# Patient Record
Sex: FEMALE | Race: WHITE | NOT HISPANIC OR LATINO | Employment: FULL TIME | URBAN - METROPOLITAN AREA
[De-identification: names, ages, dates, MRNs, and addresses within clinical notes are randomized per-mention and may not be internally consistent; named-entity substitution may affect disease eponyms.]

---

## 2017-02-20 DIAGNOSIS — J11.1 INFLUENZA: Primary | ICD-10-CM

## 2017-02-20 RX ORDER — OSELTAMIVIR PHOSPHATE 75 MG/1
75 CAPSULE ORAL 2 TIMES DAILY
Qty: 10 CAPSULE | Refills: 0 | Status: SHIPPED | OUTPATIENT
Start: 2017-02-20 | End: 2017-02-21

## 2017-02-21 ENCOUNTER — OFFICE VISIT (OUTPATIENT)
Dept: FAMILY MEDICINE CLINIC | Facility: CLINIC | Age: 47
End: 2017-02-21

## 2017-02-21 VITALS
HEART RATE: 86 BPM | OXYGEN SATURATION: 97 % | HEIGHT: 64 IN | BODY MASS INDEX: 40.97 KG/M2 | SYSTOLIC BLOOD PRESSURE: 134 MMHG | WEIGHT: 240 LBS | TEMPERATURE: 97.6 F | DIASTOLIC BLOOD PRESSURE: 88 MMHG

## 2017-02-21 DIAGNOSIS — B34.9 VIRAL SYNDROME: Primary | ICD-10-CM

## 2017-02-21 PROCEDURE — 99213 OFFICE O/P EST LOW 20 MIN: CPT | Performed by: NURSE PRACTITIONER

## 2017-02-21 RX ORDER — OSELTAMIVIR PHOSPHATE 75 MG/1
CAPSULE ORAL
COMMUNITY
Start: 2017-02-20 | End: 2017-02-21

## 2017-02-21 NOTE — PROGRESS NOTES
Subjective   Sharee Cheek is a 46 y.o. female.     History of Present Illness Daughter diagnosed with Flu A 4 days ago. 2 days ago she had sudden onset of headache, myalgia and cough. She does not want a flu test. She is worried that now she has OM due to left otalgia. She is also complaining of dry cough. No sob or wheezing. Treated herself with Tylenol.  She is a nurse at [employer]. She did have a flu vacc this year.    The following portions of the patient's history were reviewed and updated as appropriate: allergies, current medications, past family history, past medical history, past social history, past surgical history and problem list.    Review of Systems   Constitutional: Positive for fever. Negative for fatigue and unexpected weight change.   HENT: Positive for ear discharge. Negative for congestion, hearing loss, nosebleeds, rhinorrhea, sore throat, trouble swallowing and voice change.    Eyes: Negative for pain, discharge, redness and visual disturbance.   Respiratory: Positive for cough. Negative for chest tightness, shortness of breath and wheezing.    Cardiovascular: Negative for chest pain, palpitations and leg swelling.   Gastrointestinal: Negative for abdominal distention, abdominal pain, anal bleeding, blood in stool, constipation, diarrhea, nausea and vomiting.   Endocrine: Negative for cold intolerance, heat intolerance, polydipsia, polyphagia and polyuria.   Genitourinary: Negative for dysuria, flank pain, frequency and hematuria.   Musculoskeletal: Positive for myalgias. Negative for arthralgias, gait problem and joint swelling.   Skin: Negative for color change and rash.   Neurological: Negative for dizziness, tremors, seizures, syncope, speech difficulty, weakness, numbness and headaches.   Hematological: Negative.    Psychiatric/Behavioral: Negative.        Objective   Physical Exam   Constitutional: She is oriented to person, place, and time. She appears well-developed and  well-nourished. No distress.   HENT:   Head: Normocephalic and atraumatic.   Right Ear: Tympanic membrane and external ear normal.   Left Ear: Tympanic membrane and external ear normal.   Nose: Nose normal.   Mouth/Throat: Oropharynx is clear and moist. No oropharyngeal exudate.   Eyes: Conjunctivae are normal. Pupils are equal, round, and reactive to light. Right eye exhibits no discharge. Left eye exhibits no discharge. No scleral icterus.   Neck: Neck supple. No tracheal deviation present. No thyromegaly present.   Cardiovascular: Normal rate, regular rhythm and normal heart sounds.  Exam reveals no gallop and no friction rub.    No murmur heard.  Pulmonary/Chest: Effort normal and breath sounds normal. No respiratory distress. She has no wheezes.   Abdominal: Soft. Bowel sounds are normal. She exhibits no distension and no mass. There is no tenderness.   Musculoskeletal: She exhibits no edema or deformity.   Lymphadenopathy:     She has no cervical adenopathy.   Neurological: She is alert and oriented to person, place, and time. Coordination normal.   Skin: Skin is warm and dry. No rash noted. No erythema.   Psychiatric: She has a normal mood and affect. Her speech is normal and behavior is normal. Judgment and thought content normal.   Nursing note and vitals reviewed.      Assessment/Plan   Sharee was seen today for flu symptoms.    Diagnoses and all orders for this visit:    Viral syndrome    I offered to do a flu test or prescribe Tamiflu but she declined at this time. She will treat her symptoms at home. Increase fluids and rest. Follow up symptoms persist or worsen.

## 2017-04-09 DIAGNOSIS — I10 ESSENTIAL HYPERTENSION: ICD-10-CM

## 2017-04-10 RX ORDER — LOSARTAN POTASSIUM 50 MG/1
TABLET ORAL
Qty: 30 TABLET | Refills: 4 | Status: SHIPPED | OUTPATIENT
Start: 2017-04-10 | End: 2017-08-16 | Stop reason: SDUPTHER

## 2017-08-16 DIAGNOSIS — I10 ESSENTIAL HYPERTENSION: ICD-10-CM

## 2017-08-16 DIAGNOSIS — E03.9 HYPOTHYROIDISM, UNSPECIFIED TYPE: Primary | ICD-10-CM

## 2017-08-16 RX ORDER — LOSARTAN POTASSIUM 50 MG/1
50 TABLET ORAL DAILY
Qty: 30 TABLET | Refills: 4 | Status: SHIPPED | OUTPATIENT
Start: 2017-08-16 | End: 2017-12-27 | Stop reason: SDUPTHER

## 2017-08-16 RX ORDER — LEVOTHYROXINE SODIUM 0.07 MG/1
75 TABLET ORAL DAILY
Qty: 30 TABLET | Refills: 4 | Status: SHIPPED | OUTPATIENT
Start: 2017-08-16 | End: 2017-12-27 | Stop reason: SDUPTHER

## 2017-08-16 RX ORDER — VENLAFAXINE HYDROCHLORIDE 37.5 MG/1
37.5 CAPSULE, EXTENDED RELEASE ORAL DAILY
Qty: 30 CAPSULE | Refills: 4 | Status: SHIPPED | OUTPATIENT
Start: 2017-08-16 | End: 2017-12-27

## 2017-12-27 ENCOUNTER — OFFICE VISIT (OUTPATIENT)
Dept: FAMILY MEDICINE CLINIC | Facility: CLINIC | Age: 47
End: 2017-12-27

## 2017-12-27 VITALS
WEIGHT: 232 LBS | OXYGEN SATURATION: 99 % | HEART RATE: 74 BPM | BODY MASS INDEX: 39.61 KG/M2 | DIASTOLIC BLOOD PRESSURE: 82 MMHG | TEMPERATURE: 97.9 F | SYSTOLIC BLOOD PRESSURE: 152 MMHG | HEIGHT: 64 IN

## 2017-12-27 DIAGNOSIS — E04.9 GOITER: ICD-10-CM

## 2017-12-27 DIAGNOSIS — I10 ESSENTIAL HYPERTENSION: ICD-10-CM

## 2017-12-27 DIAGNOSIS — E03.9 ACQUIRED HYPOTHYROIDISM: Primary | ICD-10-CM

## 2017-12-27 DIAGNOSIS — E03.9 HYPOTHYROIDISM, UNSPECIFIED TYPE: ICD-10-CM

## 2017-12-27 LAB
ALBUMIN SERPL-MCNC: 4.1 G/DL (ref 3.2–4.8)
ALBUMIN/GLOB SERPL: 1.4 G/DL (ref 1.5–2.5)
ALP SERPL-CCNC: 91 U/L (ref 25–100)
ALT SERPL W P-5'-P-CCNC: 17 U/L (ref 7–40)
ANION GAP SERPL CALCULATED.3IONS-SCNC: 3 MMOL/L (ref 3–11)
ARTICHOKE IGE QN: 144 MG/DL (ref 0–130)
AST SERPL-CCNC: 20 U/L (ref 0–33)
BASOPHILS # BLD AUTO: 0.05 10*3/MM3 (ref 0–0.2)
BASOPHILS NFR BLD AUTO: 0.6 % (ref 0–1)
BILIRUB SERPL-MCNC: 0.3 MG/DL (ref 0.3–1.2)
BUN BLD-MCNC: 9 MG/DL (ref 9–23)
BUN/CREAT SERPL: 15 (ref 7–25)
CALCIUM SPEC-SCNC: 9.1 MG/DL (ref 8.7–10.4)
CHLORIDE SERPL-SCNC: 104 MMOL/L (ref 99–109)
CHOLEST SERPL-MCNC: 220 MG/DL (ref 0–200)
CO2 SERPL-SCNC: 32 MMOL/L (ref 20–31)
CREAT BLD-MCNC: 0.6 MG/DL (ref 0.6–1.3)
DEPRECATED RDW RBC AUTO: 42.6 FL (ref 37–54)
EOSINOPHIL # BLD AUTO: 0.23 10*3/MM3 (ref 0–0.3)
EOSINOPHIL NFR BLD AUTO: 2.8 % (ref 0–3)
ERYTHROCYTE [DISTWIDTH] IN BLOOD BY AUTOMATED COUNT: 13.9 % (ref 11.3–14.5)
EXPIRATION DATE: NORMAL
GFR SERPL CREATININE-BSD FRML MDRD: 108 ML/MIN/1.73
GLOBULIN UR ELPH-MCNC: 2.9 GM/DL
GLUCOSE BLD-MCNC: 99 MG/DL (ref 70–100)
HBA1C MFR BLD: 5.7 %
HCT VFR BLD AUTO: 39.6 % (ref 34.5–44)
HDLC SERPL-MCNC: 66 MG/DL (ref 40–60)
HGB BLD-MCNC: 12.7 G/DL (ref 11.5–15.5)
IMM GRANULOCYTES # BLD: 0.02 10*3/MM3 (ref 0–0.03)
IMM GRANULOCYTES NFR BLD: 0.2 % (ref 0–0.6)
LYMPHOCYTES # BLD AUTO: 2.17 10*3/MM3 (ref 0.6–4.8)
LYMPHOCYTES NFR BLD AUTO: 26.2 % (ref 24–44)
Lab: NORMAL
MCH RBC QN AUTO: 27 PG (ref 27–31)
MCHC RBC AUTO-ENTMCNC: 32.1 G/DL (ref 32–36)
MCV RBC AUTO: 84.3 FL (ref 80–99)
MONOCYTES # BLD AUTO: 0.49 10*3/MM3 (ref 0–1)
MONOCYTES NFR BLD AUTO: 5.9 % (ref 0–12)
NEUTROPHILS # BLD AUTO: 5.33 10*3/MM3 (ref 1.5–8.3)
NEUTROPHILS NFR BLD AUTO: 64.3 % (ref 41–71)
PLATELET # BLD AUTO: 313 10*3/MM3 (ref 150–450)
PMV BLD AUTO: 9.5 FL (ref 6–12)
POTASSIUM BLD-SCNC: 4.3 MMOL/L (ref 3.5–5.5)
PROT SERPL-MCNC: 7 G/DL (ref 5.7–8.2)
RBC # BLD AUTO: 4.7 10*6/MM3 (ref 3.89–5.14)
SODIUM BLD-SCNC: 139 MMOL/L (ref 132–146)
T4 FREE SERPL-MCNC: 1.26 NG/DL (ref 0.89–1.76)
TRIGL SERPL-MCNC: 126 MG/DL (ref 0–150)
TSH SERPL DL<=0.05 MIU/L-ACNC: 3.25 MIU/ML (ref 0.35–5.35)
WBC NRBC COR # BLD: 8.29 10*3/MM3 (ref 3.5–10.8)

## 2017-12-27 PROCEDURE — 36415 COLL VENOUS BLD VENIPUNCTURE: CPT | Performed by: FAMILY MEDICINE

## 2017-12-27 PROCEDURE — 83036 HEMOGLOBIN GLYCOSYLATED A1C: CPT | Performed by: FAMILY MEDICINE

## 2017-12-27 PROCEDURE — 85025 COMPLETE CBC W/AUTO DIFF WBC: CPT | Performed by: FAMILY MEDICINE

## 2017-12-27 PROCEDURE — 84443 ASSAY THYROID STIM HORMONE: CPT | Performed by: FAMILY MEDICINE

## 2017-12-27 PROCEDURE — 99214 OFFICE O/P EST MOD 30 MIN: CPT | Performed by: FAMILY MEDICINE

## 2017-12-27 PROCEDURE — 80061 LIPID PANEL: CPT | Performed by: FAMILY MEDICINE

## 2017-12-27 PROCEDURE — 84439 ASSAY OF FREE THYROXINE: CPT | Performed by: FAMILY MEDICINE

## 2017-12-27 PROCEDURE — 80053 COMPREHEN METABOLIC PANEL: CPT | Performed by: FAMILY MEDICINE

## 2017-12-27 RX ORDER — LOSARTAN POTASSIUM 100 MG/1
100 TABLET ORAL DAILY
Qty: 30 TABLET | Refills: 11 | Status: SHIPPED | OUTPATIENT
Start: 2017-12-27 | End: 2018-12-30 | Stop reason: SDUPTHER

## 2017-12-27 RX ORDER — LEVOTHYROXINE SODIUM 0.07 MG/1
75 TABLET ORAL DAILY
Qty: 30 TABLET | Refills: 5 | Status: SHIPPED | OUTPATIENT
Start: 2017-12-27 | End: 2018-02-26 | Stop reason: SDUPTHER

## 2017-12-27 NOTE — PROGRESS NOTES
"Subjective   Sharee Cheek is a 47 y.o. female.     History of Present Illness   The patient is here for a follow up on Hypertension and Hypothyroidism.    She states she is doing well.  Denies any chest pain or shortness of breath.    BP today is 152/82. 150/85 on recheck.  Taking Losartan 50 mg daily.    Taking Levothyroxine 75 mcg daily for Hypothyroidism.    States she is no longer taking the Venlafaxine.   She is trying to do other things to control her anxiety.  She states she has extra work load at her job and is trying to doing natural measures to control the stress and anxiety.      The following portions of the patient's history were reviewed and updated as appropriate: allergies, current medications, past social history and problem list.    Review of Systems   Respiratory: Negative for shortness of breath.    Cardiovascular: Negative for chest pain.   Psychiatric/Behavioral: The patient is nervous/anxious (intermittent).        Objective   /82  Pulse 74  Temp 97.9 °F (36.6 °C)  Ht 162.6 cm (64.02\")  Wt 105 kg (232 lb)  SpO2 99%  BMI 39.8 kg/m2  Physical Exam   Constitutional: She appears well-developed and well-nourished.   Cardiovascular: Normal rate and regular rhythm.    Pulmonary/Chest: Effort normal and breath sounds normal.   Nursing note and vitals reviewed.      Assessment/Plan   Problem List Items Addressed This Visit        Cardiovascular and Mediastinum    Hypertension       Endocrine    Hypothyroidism - Primary      Other Visit Diagnoses     Goiter                  Drink plenty fluids.  Work on diet and weight loss.    Continue medications as doing.    Check a A1c,CBC,CMP,Lipids,Free T 4, and TSH. Report results by letter.    Refer to endocrinology.    Follow up in 6 months.          Scribed for Dr Suhas Fraser by Darline Lua [facility].          I, Suhas Fraser MD, personally performed the services described in this documentation, as scribed by Darline Lua in my presence, and " is both accurate and complete.

## 2018-02-26 DIAGNOSIS — E03.9 HYPOTHYROIDISM, UNSPECIFIED TYPE: ICD-10-CM

## 2018-02-27 RX ORDER — LEVOTHYROXINE SODIUM 0.07 MG/1
TABLET ORAL
Qty: 30 TABLET | Refills: 4 | Status: SHIPPED | OUTPATIENT
Start: 2018-02-27 | End: 2018-05-31 | Stop reason: ALTCHOICE

## 2018-05-15 ENCOUNTER — OFFICE VISIT (OUTPATIENT)
Dept: ENDOCRINOLOGY | Facility: CLINIC | Age: 48
End: 2018-05-15

## 2018-05-15 VITALS
HEART RATE: 64 BPM | SYSTOLIC BLOOD PRESSURE: 130 MMHG | WEIGHT: 236.2 LBS | DIASTOLIC BLOOD PRESSURE: 74 MMHG | HEIGHT: 64 IN | OXYGEN SATURATION: 98 % | BODY MASS INDEX: 40.33 KG/M2

## 2018-05-15 DIAGNOSIS — E04.1 SOLITARY THYROID NODULE: Primary | ICD-10-CM

## 2018-05-15 DIAGNOSIS — E03.9 ACQUIRED HYPOTHYROIDISM: ICD-10-CM

## 2018-05-15 DIAGNOSIS — E06.3 HASHIMOTO'S THYROIDITIS: ICD-10-CM

## 2018-05-15 LAB
T4 FREE SERPL-MCNC: 1.36 NG/DL (ref 0.89–1.76)
TSH SERPL DL<=0.05 MIU/L-ACNC: 2.64 MIU/ML (ref 0.35–5.35)

## 2018-05-15 PROCEDURE — 84439 ASSAY OF FREE THYROXINE: CPT | Performed by: INTERNAL MEDICINE

## 2018-05-15 PROCEDURE — 99244 OFF/OP CNSLTJ NEW/EST MOD 40: CPT | Performed by: INTERNAL MEDICINE

## 2018-05-15 PROCEDURE — 84443 ASSAY THYROID STIM HORMONE: CPT | Performed by: INTERNAL MEDICINE

## 2018-05-15 PROCEDURE — 76536 US EXAM OF HEAD AND NECK: CPT | Performed by: INTERNAL MEDICINE

## 2018-05-15 NOTE — PROGRESS NOTES
Thyroid Problem (Consult for Suhas Fraser, ultrasound today. C/o throat constantly feeling tender to the touch)    Subjective   Sharee Cheek is a 48 y.o. female. she is being seen for consultation today at the request of  Suhas Fraser MD for evaluation of   Hypothyroidism dx several years ago.  Levothyroxine 75 mcg. Last TSH was 3.5. Patient has been on the same dose for several years,     Thyroid nodule noted on us in 2014 - right 1.1 cm nodule. Patient reported sx of tenderness in the lower portion of the thyroid and swallowing difficulties.         History of Present Illness  The following portions of the patient's history were reviewed and updated as appropriate: She  has a past medical history of History of conjunctivitis; History of influenza; and History of sinusitis.  She  has a past surgical history that includes Laparoscopic cholecystectomy; diagnostic laparoscopy exploratory laparotomy; hysteroscopy endometrial ablation; and Tonsillectomy and adenoidectomy.  Her family history includes COPD in her other.  She  reports that she has never smoked. She has never used smokeless tobacco. She reports that she does not drink alcohol or use drugs.  She is allergic to keflex [cephalexin] and penicillins..    Medications:    Current Outpatient Prescriptions:   •  levothyroxine (SYNTHROID, LEVOTHROID) 75 MCG tablet, TAKE ONE TABLET BY MOUTH DAILY, Disp: 30 tablet, Rfl: 4  •  losartan (COZAAR) 100 MG tablet, Take 1 tablet by mouth Daily., Disp: 30 tablet, Rfl: 11    The following portions of the patient's history were reviewed and updated as appropriate: allergies, current medications, past family history, past medical history, past social history, past surgical history and problem list.    Review of Systems   HENT: Positive for hearing loss and trouble swallowing.    Endocrine: Positive for cold intolerance.   Genitourinary: Positive for menstrual problem.   Psychiatric/Behavioral: Positive for sleep  "disturbance. The patient is nervous/anxious.    All other systems reviewed and are negative.      Objective   Blood pressure 130/74, pulse 64, height 162.6 cm (64.02\"), weight 107 kg (236 lb 3.2 oz), SpO2 98 %.   Physical Exam   Constitutional: She is oriented to person, place, and time. She appears well-developed and well-nourished.   Obese,  Facial hair on the chin and lip noted.    HENT:   Head: Normocephalic and atraumatic.   Eyes: Conjunctivae are normal.   Neck: Normal range of motion. Thyromegaly: right 1 cm nodule, tender on palpation    Cardiovascular: Normal rate, regular rhythm, normal heart sounds and intact distal pulses.    Pulmonary/Chest: Effort normal and breath sounds normal.   Musculoskeletal: She exhibits no edema.   Lymphadenopathy:     She has no cervical adenopathy.   Neurological: She is alert and oriented to person, place, and time. She has normal reflexes.   Skin: No rash noted.   Psychiatric: She has a normal mood and affect. Thought content normal.   Nursing note and vitals reviewed.        Results for orders placed or performed in visit on 12/27/17   Comprehensive Metabolic Panel   Result Value Ref Range    Glucose 99 70 - 100 mg/dL    BUN 9 9 - 23 mg/dL    Creatinine 0.60 0.60 - 1.30 mg/dL    Sodium 139 132 - 146 mmol/L    Potassium 4.3 3.5 - 5.5 mmol/L    Chloride 104 99 - 109 mmol/L    CO2 32.0 (H) 20.0 - 31.0 mmol/L    Calcium 9.1 8.7 - 10.4 mg/dL    Total Protein 7.0 5.7 - 8.2 g/dL    Albumin 4.10 3.20 - 4.80 g/dL    ALT (SGPT) 17 7 - 40 U/L    AST (SGOT) 20 0 - 33 U/L    Alkaline Phosphatase 91 25 - 100 U/L    Total Bilirubin 0.3 0.3 - 1.2 mg/dL    eGFR Non African Amer 108 >60 mL/min/1.73    Globulin 2.9 gm/dL    A/G Ratio 1.4 (L) 1.5 - 2.5 g/dL    BUN/Creatinine Ratio 15.0 7.0 - 25.0    Anion Gap 3.0 3.0 - 11.0 mmol/L   Lipid Panel   Result Value Ref Range    Total Cholesterol 220 (H) 0 - 200 mg/dL    Triglycerides 126 0 - 150 mg/dL    HDL Cholesterol 66 (H) 40 - 60 mg/dL    LDL " Cholesterol  144 (H) 0 - 130 mg/dL   T4, Free   Result Value Ref Range    Free T4 1.26 0.89 - 1.76 ng/dL   TSH   Result Value Ref Range    TSH 3.250 0.350 - 5.350 mIU/mL   CBC Auto Differential   Result Value Ref Range    WBC 8.29 3.50 - 10.80 10*3/mm3    RBC 4.70 3.89 - 5.14 10*6/mm3    Hemoglobin 12.7 11.5 - 15.5 g/dL    Hematocrit 39.6 34.5 - 44.0 %    MCV 84.3 80.0 - 99.0 fL    MCH 27.0 27.0 - 31.0 pg    MCHC 32.1 32.0 - 36.0 g/dL    RDW 13.9 11.3 - 14.5 %    RDW-SD 42.6 37.0 - 54.0 fl    MPV 9.5 6.0 - 12.0 fL    Platelets 313 150 - 450 10*3/mm3    Neutrophil % 64.3 41.0 - 71.0 %    Lymphocyte % 26.2 24.0 - 44.0 %    Monocyte % 5.9 0.0 - 12.0 %    Eosinophil % 2.8 0.0 - 3.0 %    Basophil % 0.6 0.0 - 1.0 %    Immature Grans % 0.2 0.0 - 0.6 %    Neutrophils, Absolute 5.33 1.50 - 8.30 10*3/mm3    Lymphocytes, Absolute 2.17 0.60 - 4.80 10*3/mm3    Monocytes, Absolute 0.49 0.00 - 1.00 10*3/mm3    Eosinophils, Absolute 0.23 0.00 - 0.30 10*3/mm3    Basophils, Absolute 0.05 0.00 - 0.20 10*3/mm3    Immature Grans, Absolute 0.02 0.00 - 0.03 10*3/mm3   POC Glycosylated Hemoglobin (Hb A1C)   Result Value Ref Range    Hemoglobin A1C 5.7 %    Lot Number [id number]     Expiration Date 08/31/2019              Thyroid ultrasound 05/15/18              Real time high resolution imaging of the thyroid gland was performed in transverse and longitudinal planes.      The right lobe measured 5.13 cm L x 1.73 cm AP x 2.4 cm in TV dimension.    The isthmus measured 0.39 cm in thickness.    The left thyroid lobe measured 3.96 cm L x 0.98 cm AP x 1.15 cm in TV dimension.    Thyroid gland is heterogeneous and contains single nodules.    Nodule 1   is located in the right mid lobe and measures 1.42 x 0.72 x 1.14 cm (L X AP X TV).  This nodule is solid, homogeneous, hyperechoic with well-defined margins, and Grade II vascularity on Color Flow Doppler.  It has no artifacts      No pathologic lymph nodes were seen.      Assessment: Right  dominant nodule with benign u/s characteristics - no indication for FNA.  Repeat ultrasound in 12-18 months.       Assessment/Plan:    Problem List Items Addressed This Visit        Endocrine    Hashimoto's thyroiditis    Hypothyroidism    Relevant Orders    TSH    T4, Free      Other Visit Diagnoses     Solitary thyroid nodule    -  Primary    Relevant Orders    US Thyroid (Completed)      Right thyroid nodule is benign - no indications for FNA.   Patient has tendernes sin the thyroid gland, last TSh 3.5 - consider  Increasing levothyroxine dose to 88 mcg to evaluate sx improvement.   Synthroid samples provided to see if she is feeling better.   -labs today.   Images of current u/s reviewed and compared to 2014 exam - appearance of the nodule hasn't changed.     F/u in 4 months

## 2018-05-16 ENCOUNTER — TELEPHONE (OUTPATIENT)
Dept: ENDOCRINOLOGY | Facility: CLINIC | Age: 48
End: 2018-05-16

## 2018-05-16 NOTE — TELEPHONE ENCOUNTER
----- Message from Marcia COLE MD sent at 5/15/2018 10:55 PM EDT -----  Thyroid function is normal and improved from previous results,  there is no indication to increase Synthroid dose.

## 2018-05-31 ENCOUNTER — TELEPHONE (OUTPATIENT)
Dept: ENDOCRINOLOGY | Facility: CLINIC | Age: 48
End: 2018-05-31

## 2018-05-31 RX ORDER — LEVOTHYROXINE SODIUM 75 MCG
75 TABLET ORAL DAILY
Qty: 30 TABLET | Refills: 5 | Status: SHIPPED | OUTPATIENT
Start: 2018-05-31 | End: 2019-01-21 | Stop reason: SDUPTHER

## 2018-05-31 NOTE — TELEPHONE ENCOUNTER
Rx has been sent to pharmacy for Synthroid and per  I informed pt that it was completely fine for her to have laser hair removal on her neck

## 2018-05-31 NOTE — TELEPHONE ENCOUNTER
----- Message from Gill Womack sent at 5/30/2018  3:34 PM EDT -----  CALL FROM PT STATING THE TRIAL MEDS FOR THE BRAND NAME SYNTHROID IMPROVED SYMPTOMS OVER THE GENERIC SO SHE NEEDS A RX FOR THE BRAND NAME SENT OVER TO PHARMACY, [facility]. CALL BACK NUMBER FOR PT - [phone]  PT ALSO WANTS TO KNOW WITH HER HASHIMOTOS IS LASER HAIR REMOVAL ON THE NECK OK.

## 2018-06-09 ENCOUNTER — OFFICE VISIT (OUTPATIENT)
Dept: FAMILY MEDICINE CLINIC | Facility: CLINIC | Age: 48
End: 2018-06-09

## 2018-06-09 VITALS
HEART RATE: 88 BPM | WEIGHT: 236 LBS | DIASTOLIC BLOOD PRESSURE: 88 MMHG | RESPIRATION RATE: 16 BRPM | HEIGHT: 64 IN | SYSTOLIC BLOOD PRESSURE: 130 MMHG | OXYGEN SATURATION: 96 % | BODY MASS INDEX: 40.29 KG/M2 | TEMPERATURE: 97.2 F

## 2018-06-09 DIAGNOSIS — S70.362A TICK BITE OF LEFT THIGH, INITIAL ENCOUNTER: Primary | ICD-10-CM
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
## 2018-06-09 PROCEDURE — 36415 COLL VENOUS BLD VENIPUNCTURE: CPT | Performed by: FAMILY MEDICINE

## 2018-06-09 PROCEDURE — 86618 LYME DISEASE ANTIBODY: CPT | Performed by: FAMILY MEDICINE

## 2018-06-09 PROCEDURE — 99213 OFFICE O/P EST LOW 20 MIN: CPT | Performed by: FAMILY MEDICINE

## 2018-06-09 NOTE — PROGRESS NOTES
"Subjective   Sharee Cheek is a 48 y.o. female.     Nadia presents today with a skin lesion that has developed on the left medial thigh.  She first noticed this over with ago at which time she removed and intact dear tick.  It was attached.  On careful inspection she believes that the tick remained intact.  She brings in a specimen today and examination under magnification confirms that fact.  [place] but is unsure when the tick bite occurred.  She says since removing the tick there is been a persistent to 2 mm defect.  Over the last 2 or 3 days or is been some drainage.  The drainage is not purulent and there is been no scab.  Since his been no development of surrounding a rash with no erythema.  She denies any fever chills nausea or diarrhea.         The following portions of the patient's history were reviewed and updated as appropriate: allergies, current medications, past social history and problem list.    Review of Systems   Constitutional: Negative for chills, diaphoresis, fatigue and fever.   HENT: Negative for congestion.    Eyes: Negative for photophobia and redness.   Respiratory: Negative for cough and shortness of breath.    Cardiovascular: Negative for chest pain and leg swelling.   Gastrointestinal: Negative for nausea and vomiting.   Genitourinary: Negative for dysuria.   Musculoskeletal: Negative for arthralgias and myalgias.   Skin: Positive for wound.   Neurological: Negative for numbness and headaches.   Hematological: Negative for adenopathy. Does not bruise/bleed easily.       Objective   /88   Pulse 88   Temp 97.2 °F (36.2 °C)   Resp 16   Ht 162.6 cm (64.02\")   Wt 107 kg (236 lb)   SpO2 96%   BMI 40.48 kg/m²   Physical Exam   Constitutional: She appears well-developed and well-nourished.   Cardiovascular: Normal rate and regular rhythm.    Pulmonary/Chest: Effort normal and breath sounds normal.   Abdominal: Soft. Bowel " sounds are normal. There is no tenderness.   Skin:   examination of the medial aspect of the left thigh reveals a 2 mm superficial defect in the skin.  There is no erythema.  I do not detect any exudate as well.   Nursing note and vitals reviewed.      Assessment/Plan   Problem List Items Addressed This Visit     None      Visit Diagnoses     Tick bite of left thigh, initial encounter    -  Primary    Relevant Orders    Lyme, Total Antibody Test / Reflex       The patient definitely is suffered a bite to the left thigh as a result of a deer tick.  There is no rash and there are no systemic symptoms.  Nevertheless exposure to Lyme disease is to be considered.  We will check old G4 Lyme disease and the patient will check on my chart next week to see about the results.  We will also notify her of the results.  Should she develop a rash or should the Lyme antibody be positive then we will treat her with doxycycline.  Otherwise there should be no need.  The tick was examined today and is definitely a deer tick in the adult phase.

## 2018-06-11 LAB — B BURGDOR IGG+IGM SER-ACNC: <0.91 ISR (ref 0–0.9)

## 2018-06-12 ENCOUNTER — TELEPHONE (OUTPATIENT)
Dept: FAMILY MEDICINE CLINIC | Facility: CLINIC | Age: 48
End: 2018-06-12

## 2018-06-12 NOTE — TELEPHONE ENCOUNTER
I left a message on the patient's mobile phone.  I advised her that the initial testing for Lyme disease returned with negative results.  I told her that she should observe the site of the tick bite, to insure that it does not increase in its appearance - if it does, then return to see us.  Also I advised her that should she develop any myalgias arthralgias or fever to return in that instance as well.  Otherwise I think nothing more needs to be done.

## 2018-12-30 DIAGNOSIS — I10 ESSENTIAL HYPERTENSION: ICD-10-CM

## 2018-12-31 RX ORDER — LOSARTAN POTASSIUM 100 MG/1
TABLET ORAL
Qty: 30 TABLET | Refills: 3 | Status: SHIPPED | OUTPATIENT
Start: 2018-12-31 | End: 2019-01-21 | Stop reason: ALTCHOICE

## 2019-01-21 ENCOUNTER — OFFICE VISIT (OUTPATIENT)
Dept: FAMILY MEDICINE CLINIC | Facility: CLINIC | Age: 49
End: 2019-01-21

## 2019-01-21 VITALS
TEMPERATURE: 97.4 F | OXYGEN SATURATION: 98 % | WEIGHT: 232.6 LBS | DIASTOLIC BLOOD PRESSURE: 88 MMHG | SYSTOLIC BLOOD PRESSURE: 124 MMHG | HEART RATE: 75 BPM | RESPIRATION RATE: 20 BRPM | HEIGHT: 64 IN | BODY MASS INDEX: 39.71 KG/M2

## 2019-01-21 DIAGNOSIS — E03.9 ACQUIRED HYPOTHYROIDISM: ICD-10-CM

## 2019-01-21 DIAGNOSIS — I10 ESSENTIAL HYPERTENSION: Primary | ICD-10-CM

## 2019-01-21 LAB
ALBUMIN SERPL-MCNC: 4.16 G/DL (ref 3.2–4.8)
ALBUMIN/GLOB SERPL: 1.6 G/DL (ref 1.5–2.5)
ALP SERPL-CCNC: 92 U/L (ref 25–100)
ALT SERPL W P-5'-P-CCNC: 18 U/L (ref 7–40)
ANION GAP SERPL CALCULATED.3IONS-SCNC: 4 MMOL/L (ref 3–11)
ARTICHOKE IGE QN: 130 MG/DL (ref 0–130)
AST SERPL-CCNC: 19 U/L (ref 0–33)
BASOPHILS # BLD AUTO: 0.04 10*3/MM3 (ref 0–0.2)
BASOPHILS NFR BLD AUTO: 0.5 % (ref 0–1)
BILIRUB SERPL-MCNC: 0.3 MG/DL (ref 0.3–1.2)
BUN BLD-MCNC: 12 MG/DL (ref 9–23)
BUN/CREAT SERPL: 18.2 (ref 7–25)
CALCIUM SPEC-SCNC: 8.7 MG/DL (ref 8.7–10.4)
CHLORIDE SERPL-SCNC: 105 MMOL/L (ref 99–109)
CHOLEST SERPL-MCNC: 201 MG/DL (ref 0–200)
CO2 SERPL-SCNC: 30 MMOL/L (ref 20–31)
CREAT BLD-MCNC: 0.66 MG/DL (ref 0.6–1.3)
DEPRECATED RDW RBC AUTO: 43 FL (ref 37–54)
EOSINOPHIL # BLD AUTO: 0.15 10*3/MM3 (ref 0–0.3)
EOSINOPHIL NFR BLD AUTO: 1.9 % (ref 0–3)
ERYTHROCYTE [DISTWIDTH] IN BLOOD BY AUTOMATED COUNT: 13.9 % (ref 11.3–14.5)
GFR SERPL CREATININE-BSD FRML MDRD: 96 ML/MIN/1.73
GLOBULIN UR ELPH-MCNC: 2.5 GM/DL
GLUCOSE BLD-MCNC: 108 MG/DL (ref 70–100)
HCT VFR BLD AUTO: 40.3 % (ref 34.5–44)
HDLC SERPL-MCNC: 67 MG/DL (ref 40–60)
HGB BLD-MCNC: 12.9 G/DL (ref 11.5–15.5)
IMM GRANULOCYTES # BLD AUTO: 0.02 10*3/MM3 (ref 0–0.03)
IMM GRANULOCYTES NFR BLD AUTO: 0.2 % (ref 0–0.6)
LYMPHOCYTES # BLD AUTO: 1.98 10*3/MM3 (ref 0.6–4.8)
LYMPHOCYTES NFR BLD AUTO: 24.5 % (ref 24–44)
MCH RBC QN AUTO: 27.3 PG (ref 27–31)
MCHC RBC AUTO-ENTMCNC: 32 G/DL (ref 32–36)
MCV RBC AUTO: 85.4 FL (ref 80–99)
MONOCYTES # BLD AUTO: 0.42 10*3/MM3 (ref 0–1)
MONOCYTES NFR BLD AUTO: 5.2 % (ref 0–12)
NEUTROPHILS # BLD AUTO: 5.48 10*3/MM3 (ref 1.5–8.3)
NEUTROPHILS NFR BLD AUTO: 67.9 % (ref 41–71)
PLATELET # BLD AUTO: 308 10*3/MM3 (ref 150–450)
PMV BLD AUTO: 9.6 FL (ref 6–12)
POTASSIUM BLD-SCNC: 4.5 MMOL/L (ref 3.5–5.5)
PROT SERPL-MCNC: 6.7 G/DL (ref 5.7–8.2)
RBC # BLD AUTO: 4.72 10*6/MM3 (ref 3.89–5.14)
SODIUM BLD-SCNC: 139 MMOL/L (ref 132–146)
TRIGL SERPL-MCNC: 70 MG/DL (ref 0–150)
WBC NRBC COR # BLD: 8.07 10*3/MM3 (ref 3.5–10.8)

## 2019-01-21 PROCEDURE — 80053 COMPREHEN METABOLIC PANEL: CPT | Performed by: FAMILY MEDICINE

## 2019-01-21 PROCEDURE — 80061 LIPID PANEL: CPT | Performed by: FAMILY MEDICINE

## 2019-01-21 PROCEDURE — 99213 OFFICE O/P EST LOW 20 MIN: CPT | Performed by: FAMILY MEDICINE

## 2019-01-21 PROCEDURE — 36415 COLL VENOUS BLD VENIPUNCTURE: CPT | Performed by: FAMILY MEDICINE

## 2019-01-21 PROCEDURE — 85025 COMPLETE CBC W/AUTO DIFF WBC: CPT | Performed by: FAMILY MEDICINE

## 2019-01-21 RX ORDER — LISINOPRIL 40 MG/1
40 TABLET ORAL DAILY
Qty: 30 TABLET | Refills: 5 | Status: SHIPPED | OUTPATIENT
Start: 2019-01-21 | End: 2019-07-24 | Stop reason: SDUPTHER

## 2019-01-21 RX ORDER — LEVOTHYROXINE SODIUM 75 MCG
75 TABLET ORAL DAILY
Qty: 30 TABLET | Refills: 5 | Status: SHIPPED | OUTPATIENT
Start: 2019-01-21 | End: 2019-07-24 | Stop reason: SDUPTHER

## 2019-01-21 NOTE — PROGRESS NOTES
"Subjective   Sharee Cheek is a 48 y.o. female seen today for Hypertension.     History of Present Illness   The patient is here for a follow up on Hypertension.    States she is doing well.  Denies any chest pain or shortness of breath.  States she is having an occasional panic attack. These have gotten less frequent since her mothers passing.    BP today is 124/88. 140/84 on recheck.  Taking Losartan 100 mg daily.  Discussed the recall of Losartan with her pharmacist and was recommended she change to another BP medication.    Also when she takes generic thyroid replacement she experiences some tightening of her neck.  She asked that she be allowed to take brand-name only medication.        The following portions of the patient's history were reviewed and updated as appropriate: allergies, current medications, past social history and problem list.    Review of Systems   Respiratory: Negative for shortness of breath.    Cardiovascular: Negative for chest pain.       Objective   /88   Pulse 75   Temp 97.4 °F (36.3 °C) (Temporal)   Resp 20   Ht 162.6 cm (64\")   Wt 106 kg (232 lb 9.6 oz)   SpO2 98%   BMI 39.93 kg/m²   Physical Exam   Constitutional: She appears well-developed and well-nourished.   Cardiovascular: Normal rate and regular rhythm.   Pulmonary/Chest: Effort normal and breath sounds normal.   Nursing note and vitals reviewed.      Assessment/Plan   Problem List Items Addressed This Visit        Cardiovascular and Mediastinum    Hypertension - Primary       Endocrine    Hypothyroidism        Patient has history of hypothyroidism and hypertension.  Blood pressure today was 140/90.  Because of the recall of the losartan will change her to lisinopril 40 mg a day.  Because of side effects associated with use of levothyroxine we will change her to brand name Synthroid 75 µg a day.  Return to see us in 6 months and we'll check fasting lab work at that time.      Drink plenty fluids.    Stop " Losartan.  Rx for Lisinopril 40 mg daily #30+5.    Continue other medications as doing.    Refill Synthroid 75 mcg daily #30+5.    Check a CBC,CMP, and Lipids. Report results by letter.    Follow up in 6 months. Sooner if needed.                  Scribed for Dr Suhas Fraser by Darline Lua CMA.          I, Suhas Fraser MD, personally performed the services described in this documentation, as scribed by Darline Lua in my presence, and is both accurate and complete.        (Please note that portions of this note were completed with a voice recognition program. Efforts were made to edit the dictations,but occasionally words are mis transcribed.)

## 2019-05-16 ENCOUNTER — OFFICE VISIT (OUTPATIENT)
Dept: FAMILY MEDICINE CLINIC | Facility: CLINIC | Age: 49
End: 2019-05-16

## 2019-05-16 VITALS
TEMPERATURE: 97.5 F | HEIGHT: 64 IN | DIASTOLIC BLOOD PRESSURE: 100 MMHG | SYSTOLIC BLOOD PRESSURE: 130 MMHG | BODY MASS INDEX: 40.12 KG/M2 | HEART RATE: 70 BPM | OXYGEN SATURATION: 97 % | WEIGHT: 235 LBS | RESPIRATION RATE: 16 BRPM

## 2019-05-16 DIAGNOSIS — N63.21 BREAST LUMP ON LEFT SIDE AT 2 O'CLOCK POSITION: Primary | ICD-10-CM

## 2019-05-16 DIAGNOSIS — L23.7 CONTACT DERMATITIS DUE TO POISON IVY: ICD-10-CM

## 2019-05-16 DIAGNOSIS — Z23 NEED FOR HEPATITIS A VACCINATION: ICD-10-CM

## 2019-05-16 PROCEDURE — 90632 HEPA VACCINE ADULT IM: CPT | Performed by: FAMILY MEDICINE

## 2019-05-16 PROCEDURE — 99214 OFFICE O/P EST MOD 30 MIN: CPT | Performed by: FAMILY MEDICINE

## 2019-05-16 PROCEDURE — 90471 IMMUNIZATION ADMIN: CPT | Performed by: FAMILY MEDICINE

## 2019-05-16 NOTE — PROGRESS NOTES
"Subjective   Sharee Cheek is a 49 y.o. female seen today for Breast Mass and Immunizations.     History of Present Illness   The patient is here today with c/o rash of the face and lump in the left breast.    States she first felt the limp about 6 months ago and has gotten bigger.  Her last mammogram was about age 41.    States she was working in the yard last week and now has a itchy rash on the face between the eyes.    Also wanting to get Hep A #2 today. Had #1 at the pharmacy in November.    The following portions of the patient's history were reviewed and updated as appropriate: allergies, current medications, past social history and problem list.    Review of Systems   Skin: Positive for rash (face between eyes).        Lump of the left breast.       Objective   /100   Pulse 70   Temp 97.5 °F (36.4 °C)   Resp 16   Ht 162.6 cm (64\")   Wt 107 kg (235 lb)   SpO2 97%   BMI 40.34 kg/m²   Physical Exam   Constitutional: She appears well-developed and well-nourished.   Pulmonary/Chest:       Nursing note and vitals reviewed.      Assessment/Plan   Problem List Items Addressed This Visit     None              Drink plenty fluids.    Continue medications as doing.    Check a Mammogram bilateral.  And an Ultrasound of the left breast. Report results by letter.    Rx for Hydrocortisone 2.5% cream to apply twice a day #20 GM +0.    Given Hep A #2 today.    Follow up in 3 weeks.                  Scribed for Dr Suhas Fraser by Darline Lua CMA.          I, Suhas Fraser MD, personally performed the services described in this documentation, as scribed by Darline Lua in my presence, and is both accurate and complete.        (Please note that portions of this note were completed with a voice recognition program. Efforts were made to edit the dictations,but occasionally words are mis transcribed.)      "

## 2019-07-24 DIAGNOSIS — E03.9 ACQUIRED HYPOTHYROIDISM: ICD-10-CM

## 2019-07-24 DIAGNOSIS — I10 ESSENTIAL HYPERTENSION: ICD-10-CM

## 2019-07-25 RX ORDER — LISINOPRIL 40 MG/1
TABLET ORAL
Qty: 30 TABLET | Refills: 4 | Status: SHIPPED | OUTPATIENT
Start: 2019-07-25 | End: 2019-11-14 | Stop reason: SDUPTHER

## 2019-07-25 RX ORDER — LEVOTHYROXINE SODIUM 75 MCG
TABLET ORAL
Qty: 30 TABLET | Refills: 4 | Status: SHIPPED | OUTPATIENT
Start: 2019-07-25 | End: 2019-11-14 | Stop reason: SDUPTHER

## 2019-10-19 ENCOUNTER — OFFICE VISIT (OUTPATIENT)
Dept: FAMILY MEDICINE CLINIC | Facility: CLINIC | Age: 49
End: 2019-10-19

## 2019-10-19 VITALS
WEIGHT: 239.2 LBS | DIASTOLIC BLOOD PRESSURE: 92 MMHG | HEIGHT: 64 IN | OXYGEN SATURATION: 97 % | BODY MASS INDEX: 40.84 KG/M2 | HEART RATE: 97 BPM | RESPIRATION RATE: 20 BRPM | SYSTOLIC BLOOD PRESSURE: 140 MMHG | TEMPERATURE: 96.8 F

## 2019-10-19 DIAGNOSIS — J40 BRONCHITIS: ICD-10-CM

## 2019-10-19 DIAGNOSIS — J06.9 ACUTE URI: Primary | ICD-10-CM

## 2019-10-19 PROCEDURE — 99213 OFFICE O/P EST LOW 20 MIN: CPT | Performed by: FAMILY MEDICINE

## 2019-10-19 RX ORDER — GUAIFENESIN AND CODEINE PHOSPHATE 100; 10 MG/5ML; MG/5ML
5 SOLUTION ORAL 3 TIMES DAILY PRN
Qty: 118 ML | Refills: 0 | OUTPATIENT
Start: 2019-10-19 | End: 2019-11-12

## 2019-10-19 RX ORDER — AZITHROMYCIN 250 MG/1
TABLET, FILM COATED ORAL
Qty: 6 TABLET | Refills: 0 | OUTPATIENT
Start: 2019-10-19 | End: 2019-11-12

## 2019-10-19 NOTE — PROGRESS NOTES
Subjective   Sharee Cheek is a 49 y.o. female.     URI    This is a new problem. The current episode started in the past 7 days. The problem has been unchanged. There has been no fever. Associated symptoms include congestion, coughing, rhinorrhea, sinus pain and wheezing. Pertinent negatives include no abdominal pain, chest pain, diarrhea, dysuria, nausea, rash, sneezing or sore throat. Treatments tried: stahist. The treatment provided mild relief.   Works at [email or web address] and has been around sickness.     The following portions of the patient's history were reviewed and updated as appropriate: allergies, current medications, past family history, past medical history, past social history, past surgical history and problem list.  Vitals:    10/19/19 0919   BP: 140/92   Pulse: 97   Resp: 20   Temp: 96.8 °F (36 °C)   SpO2: 97%     Body mass index is 41.06 kg/m².  Review of Systems   Constitutional: Negative.  Negative for activity change, fatigue, fever, unexpected weight gain and unexpected weight loss.   HENT: Positive for congestion and rhinorrhea. Negative for sneezing and sore throat.    Eyes: Negative.  Negative for blurred vision, double vision and visual disturbance.   Respiratory: Positive for cough and wheezing. Negative for chest tightness and shortness of breath.    Cardiovascular: Negative.  Negative for chest pain, palpitations and leg swelling.   Gastrointestinal: Negative.  Negative for abdominal distention, abdominal pain, blood in stool, constipation, diarrhea and nausea.   Endocrine: Negative.  Negative for cold intolerance and heat intolerance.   Genitourinary: Negative.  Negative for dysuria, frequency, urgency and urinary incontinence.   Musculoskeletal: Negative.  Negative for arthralgias and myalgias.   Skin: Negative.  Negative for rash.   Allergic/Immunologic: Negative.    Neurological: Negative.  Negative for dizziness, syncope, numbness and memory problem.   Hematological: Negative.   Negative for adenopathy.   Psychiatric/Behavioral: Negative.  Negative for suicidal ideas and depressed mood. The patient is not nervous/anxious.    All other systems reviewed and are negative.      Objective   Physical Exam   Constitutional: She is oriented to person, place, and time. She appears well-developed and well-nourished. No distress.   HENT:   Right Ear: External ear normal.   Left Ear: External ear normal.   Nose: Rhinorrhea and congestion present.   Mouth/Throat: Oropharynx is clear and moist.   Eyes: Conjunctivae and EOM are normal. Pupils are equal, round, and reactive to light.   Neck: Normal range of motion. Neck supple. No thyromegaly present.   Cardiovascular: Normal rate, regular rhythm and normal heart sounds.   No murmur heard.  Pulmonary/Chest: Effort normal and breath sounds normal. No respiratory distress. She has no wheezes.   Abdominal: Soft. Bowel sounds are normal. She exhibits no distension and no mass. There is no tenderness. There is no rebound and no guarding. No hernia.   Musculoskeletal: Normal range of motion. She exhibits no edema or tenderness.   Lymphadenopathy:     She has no cervical adenopathy.   Neurological: She is alert and oriented to person, place, and time. She has normal reflexes.   Skin: Skin is warm and dry. No rash noted. She is not diaphoretic. No erythema. No pallor.   Psychiatric: She has a normal mood and affect. Her behavior is normal. Judgment and thought content normal.   Nursing note and vitals reviewed.          Assessment/Plan   Sharee was seen today for cough.    Diagnoses and all orders for this visit:    Acute URI    Bronchitis  -     albuterol (PROAIR RESPICLICK) 108 (90 Base) MCG/ACT inhaler; Inhale 2 puffs Every 4 (Four) Hours As Needed for Wheezing.  -     guaifenesin-codeine (GUAIFENESIN AC) 100-10 MG/5ML liquid; Take 5 mL by mouth 3 (Three) Times a Day As Needed for Cough.  -     azithromycin (ZITHROMAX Z-CHINA) 250 MG tablet; Take 2 tablets the  first day, then 1 tablet daily for 4 days.

## 2019-11-12 ENCOUNTER — HOSPITAL ENCOUNTER (EMERGENCY)
Facility: HOSPITAL | Age: 49
Discharge: HOME | End: 2019-11-12
Attending: EMERGENCY MEDICINE | Admitting: EMERGENCY MEDICINE

## 2019-11-12 VITALS
SYSTOLIC BLOOD PRESSURE: 152 MMHG | RESPIRATION RATE: 16 BRPM | BODY MASS INDEX: 38.32 KG/M2 | HEART RATE: 69 BPM | TEMPERATURE: 99 F | OXYGEN SATURATION: 95 % | DIASTOLIC BLOOD PRESSURE: 86 MMHG | HEIGHT: 65 IN | WEIGHT: 230 LBS

## 2019-11-12 DIAGNOSIS — T78.40XA ACUTE ALLERGIC REACTION, INITIAL ENCOUNTER: Primary | ICD-10-CM

## 2019-11-12 DIAGNOSIS — Z86.79 HISTORY OF HYPERTENSION: ICD-10-CM

## 2019-11-12 DIAGNOSIS — Z86.39 HISTORY OF HASHIMOTO THYROIDITIS: ICD-10-CM

## 2019-11-12 PROCEDURE — 25010000002 DIPHENHYDRAMINE PER 50 MG: Performed by: PHYSICIAN ASSISTANT

## 2019-11-12 PROCEDURE — 25010000002 METHYLPREDNISOLONE PER 125 MG: Performed by: PHYSICIAN ASSISTANT

## 2019-11-12 PROCEDURE — 96374 THER/PROPH/DIAG INJ IV PUSH: CPT

## 2019-11-12 PROCEDURE — 99283 EMERGENCY DEPT VISIT LOW MDM: CPT

## 2019-11-12 PROCEDURE — 25010000002 EPINEPHRINE (ANAPHYLAXIS) 1 MG/ML SOLUTION: Performed by: PHYSICIAN ASSISTANT

## 2019-11-12 PROCEDURE — 96372 THER/PROPH/DIAG INJ SC/IM: CPT

## 2019-11-12 PROCEDURE — 96375 TX/PRO/DX INJ NEW DRUG ADDON: CPT

## 2019-11-12 RX ORDER — METHYLPREDNISOLONE 4 MG/1
TABLET ORAL
Qty: 21 TABLET | Refills: 0 | Status: SHIPPED | OUTPATIENT
Start: 2019-11-12 | End: 2019-12-11

## 2019-11-12 RX ORDER — EPINEPHRINE 0.3 MG/.3ML
0.3 INJECTION SUBCUTANEOUS ONCE
Qty: 1 EACH | Refills: 1 | Status: SHIPPED | OUTPATIENT
Start: 2019-11-12 | End: 2019-11-12

## 2019-11-12 RX ORDER — FAMOTIDINE 10 MG/ML
20 INJECTION, SOLUTION INTRAVENOUS ONCE
Status: COMPLETED | OUTPATIENT
Start: 2019-11-12 | End: 2019-11-12

## 2019-11-12 RX ORDER — FAMOTIDINE 20 MG/1
20 TABLET, FILM COATED ORAL 2 TIMES DAILY
Qty: 14 TABLET | Refills: 0 | Status: SHIPPED | OUTPATIENT
Start: 2019-11-12 | End: 2019-12-11

## 2019-11-12 RX ORDER — DIPHENHYDRAMINE HYDROCHLORIDE 50 MG/ML
25 INJECTION INTRAMUSCULAR; INTRAVENOUS ONCE
Status: DISCONTINUED | OUTPATIENT
Start: 2019-11-12 | End: 2019-11-12 | Stop reason: HOSPADM

## 2019-11-12 RX ORDER — METHYLPREDNISOLONE SODIUM SUCCINATE 125 MG/2ML
125 INJECTION, POWDER, LYOPHILIZED, FOR SOLUTION INTRAMUSCULAR; INTRAVENOUS ONCE
Status: COMPLETED | OUTPATIENT
Start: 2019-11-12 | End: 2019-11-12

## 2019-11-12 RX ORDER — EPINEPHRINE 1 MG/ML
0.3 INJECTION, SOLUTION INTRAMUSCULAR; SUBCUTANEOUS ONCE
Status: COMPLETED | OUTPATIENT
Start: 2019-11-12 | End: 2019-11-12

## 2019-11-12 RX ADMIN — EPINEPHRINE 0.3 MG: 1 INJECTION INTRAMUSCULAR; INTRAVENOUS; SUBCUTANEOUS at 20:35

## 2019-11-12 RX ADMIN — METHYLPREDNISOLONE SODIUM SUCCINATE 125 MG: 125 INJECTION, POWDER, FOR SOLUTION INTRAMUSCULAR; INTRAVENOUS at 20:23

## 2019-11-12 RX ADMIN — SODIUM CHLORIDE 1000 ML: 9 INJECTION, SOLUTION INTRAVENOUS at 20:22

## 2019-11-12 RX ADMIN — FAMOTIDINE 20 MG: 10 INJECTION, SOLUTION INTRAVENOUS at 20:23

## 2019-11-13 NOTE — ED PROVIDER NOTES
Subjective   Sharee Cheek is a 49 year old female presenting to the ED today with complaints of an allergic reaction. She reports today at 1900 she began experiencing itchiness in her mouth. Then by 1945 she began experiencing itching and edema of her tongue and throat. She also complains of itchiness and erythema at her hands. She reports taking 50mg of Benadryl at 1950 but finding no relief. She denies shortness of breath or rash. She also denies recently being around anyone sick, any recent medications changes, or using any new detergents. She states 13 years ago she had an anaphylactic reaction that began with hives and wheezing.  She had allergy skin testing at that time and was told that she had no specific allergies.  She has a history of hashimoto thyroiditiss  and hypertension. Her primary care provider is Dr. Suhas Fraser. There are no other acute complaints at this time.            History provided by:  Patient  Allergic Reaction   Presenting symptoms: itching and swelling    Presenting symptoms: no difficulty breathing, no difficulty swallowing and no rash    Severity:  Moderate  Prior episodes: anaphylactic reaction 13 years ago.  Context: not medications and not new detergents/soaps    Ineffective treatments: 50mg Benadryl.      Review of Systems   HENT: Negative for trouble swallowing.         Positive for itching and edema of tongue and throat.    Respiratory: Negative for chest tightness and shortness of breath.    Cardiovascular: Negative for chest pain and palpitations.   Skin: Positive for color change and itching. Negative for rash.        Erythema to palms of both hands.   All other systems reviewed and are negative.      Past Medical History:   Diagnosis Date   • History of conjunctivitis    • History of influenza    • History of sinusitis    • Hypertension    • Insomnia        Allergies   Allergen Reactions   • Keflex [Cephalexin]    • Penicillins        Past Surgical History:   Procedure  Laterality Date   • DIAGNOSTIC LAPAROSCOPY EXPLORATORY LAPAROTOMY     • HYSTEROSCOPY ENDOMETRIAL ABLATION     • LAPAROSCOPIC CHOLECYSTECTOMY     • TONSILLECTOMY AND ADENOIDECTOMY         Family History   Problem Relation Age of Onset   • COPD Other    • Breast cancer Mother    • Breast cancer Maternal Grandmother    • Ovarian cancer Maternal Grandmother    • Diabetes Maternal Grandmother    • Polycystic ovary syndrome Maternal Grandmother        Social History     Socioeconomic History   • Marital status:      Spouse name: Not on file   • Number of children: Not on file   • Years of education: Not on file   • Highest education level: Not on file   Tobacco Use   • Smoking status: Never Smoker   • Smokeless tobacco: Never Used   Substance and Sexual Activity   • Alcohol use: No   • Drug use: No   • Sexual activity: Defer         Objective   Physical Exam   Constitutional: She is oriented to person, place, and time. She appears well-developed and well-nourished. No distress.   HENT:   Head: Normocephalic and atraumatic.   Appears thick in tongue but oropharynx is patent. No angioedema of lips, mouth, or face.   Eyes: Conjunctivae are normal. No scleral icterus.   Neck: Normal range of motion. Neck supple.   Cardiovascular: Normal rate, regular rhythm and normal heart sounds.   No murmur heard.  Pulmonary/Chest: Effort normal and breath sounds normal. No accessory muscle usage or stridor. No tachypnea. No respiratory distress. She has no wheezes.   No retractions or tachypnea.   Abdominal: Soft. There is no tenderness.   Musculoskeletal: Normal range of motion.   Neurological: She is alert and oriented to person, place, and time.   Skin: Skin is warm and dry. No rash noted. There is erythema.   No skin lesions or rash. Confluent erythema to palms of hands and patient is scratching at them.   Psychiatric: Her behavior is normal. Her mood appears anxious (slightly).   Nursing note and vitals  reviewed.      Procedures         ED Course  ED Course as of Nov 12 2137 Tue Nov 12, 2019 2128 Patient feeling much better after EpiPen and IV Solu-Medrol and Pepcid.  She took Benadryl 50 mg by mouth prior to arrival.  Unknown etiology of acute allergic reaction.  Patient reports allergy testing several years ago that was normal.  We will refer her to allergy/asthma specialist for repeat skin testing.  I will prescribe an EpiPen, along with Medrol Dosepak and recommend Pepcid 20 mg by mouth twice daily x1 week.  Patient may continue Benadryl 25 mg by mouth every 4-6 hours as needed for itching.  The redness to patient's palms has improved and patient is tongue is no longer thickened.  She denies any fullness to the tongue or difficulty swallowing.  Vital signs are stable and she is ready for discharge to home.  [FC]      ED Course User Index  [FC] Leticia Bradley PA-C     No results found for this or any previous visit (from the past 24 hour(s)).  Note: In addition to lab results from this visit, the labs listed above may include labs taken at another facility or during a different encounter within the last 24 hours. Please correlate lab times with ED admission and discharge times for further clarification of the services performed during this visit.    No orders to display     Vitals:    11/12/19 2015 11/12/19 2030 11/12/19 2031 11/12/19 2100   BP:    152/86   BP Location:       Pulse: 78  66 69   Resp:    16   Temp:       TempSrc:       SpO2: 96% 95%  95%   Weight:       Height:         Medications   diphenhydrAMINE (BENADRYL) injection 25 mg (25 mg Intravenous Not Given 11/12/19 2023)   sodium chloride 0.9 % bolus 1,000 mL (1,000 mL Intravenous New Bag 11/12/19 2022)   methylPREDNISolone sodium succinate (SOLU-Medrol) injection 125 mg (125 mg Intravenous Given 11/12/19 2023)   famotidine (PEPCID) injection 20 mg (20 mg Intravenous Given 11/12/19 2023)   EPINEPHrine (Anaphylaxis) (ADRENALIN) injection 0.3 mg  (0.3 mg Intramuscular Given 11/12/19 2035)     ECG/EMG Results (last 24 hours)     ** No results found for the last 24 hours. **        No orders to display                       MDM    Final diagnoses:   Acute allergic reaction, initial encounter   History of hypertension   History of Hashimoto thyroiditis       Documentation assistance provided by sasha Isabel.  Information recorded by the scribe was done at my direction and has been verified and validated by me.     Estella Isabel  11/12/19 2115       Leticia Bradley PA-C  11/12/19 2137

## 2019-11-13 NOTE — DISCHARGE INSTRUCTIONS
Patient had an acute allergic reaction to unknown etiology.  We administered epi pen., IV Solu-Medrol, and IV Pepcid.  We will prescribe EpiPen, Medrol Dosepak, and Pepcid 20 mg by mouth twice daily x7 days.  Patient may continue OTC Benadryl every 4-6 hours as needed for itching.  Recommend close follow-up with allergy, asthma, and immunology to discuss repeat allergy testing.  Continue with all other current medical management.  Return to the ER if any worsening symptoms.

## 2019-11-14 ENCOUNTER — OFFICE VISIT (OUTPATIENT)
Dept: FAMILY MEDICINE CLINIC | Facility: CLINIC | Age: 49
End: 2019-11-14

## 2019-11-14 VITALS
WEIGHT: 242 LBS | HEIGHT: 65 IN | RESPIRATION RATE: 12 BRPM | DIASTOLIC BLOOD PRESSURE: 86 MMHG | OXYGEN SATURATION: 96 % | TEMPERATURE: 97.5 F | BODY MASS INDEX: 40.32 KG/M2 | SYSTOLIC BLOOD PRESSURE: 130 MMHG | HEART RATE: 71 BPM

## 2019-11-14 DIAGNOSIS — B35.1 ONYCHOMYCOSIS OF RIGHT GREAT TOE: ICD-10-CM

## 2019-11-14 DIAGNOSIS — I10 ESSENTIAL HYPERTENSION: ICD-10-CM

## 2019-11-14 DIAGNOSIS — T78.2XXD ANAPHYLAXIS, SUBSEQUENT ENCOUNTER: Primary | ICD-10-CM

## 2019-11-14 DIAGNOSIS — E03.9 ACQUIRED HYPOTHYROIDISM: ICD-10-CM

## 2019-11-14 LAB
ALBUMIN SERPL-MCNC: 4 G/DL (ref 3.5–5.2)
ALBUMIN/GLOB SERPL: 1.1 G/DL
ALP SERPL-CCNC: 75 U/L (ref 39–117)
ALT SERPL W P-5'-P-CCNC: 11 U/L (ref 1–33)
ANION GAP SERPL CALCULATED.3IONS-SCNC: 8.2 MMOL/L (ref 5–15)
AST SERPL-CCNC: 13 U/L (ref 1–32)
BASOPHILS # BLD AUTO: 0.05 10*3/MM3 (ref 0–0.2)
BASOPHILS NFR BLD AUTO: 0.4 % (ref 0–1.5)
BILIRUB SERPL-MCNC: 0.2 MG/DL (ref 0.2–1.2)
BUN BLD-MCNC: 15 MG/DL (ref 6–20)
BUN/CREAT SERPL: 20.5 (ref 7–25)
CALCIUM SPEC-SCNC: 8.9 MG/DL (ref 8.6–10.5)
CHLORIDE SERPL-SCNC: 105 MMOL/L (ref 98–107)
CHOLEST SERPL-MCNC: 186 MG/DL (ref 0–200)
CO2 SERPL-SCNC: 31.8 MMOL/L (ref 22–29)
CREAT BLD-MCNC: 0.73 MG/DL (ref 0.57–1)
DEPRECATED RDW RBC AUTO: 42.7 FL (ref 37–54)
EOSINOPHIL # BLD AUTO: 0.1 10*3/MM3 (ref 0–0.4)
EOSINOPHIL NFR BLD AUTO: 0.8 % (ref 0.3–6.2)
ERYTHROCYTE [DISTWIDTH] IN BLOOD BY AUTOMATED COUNT: 13.8 % (ref 12.3–15.4)
GFR SERPL CREATININE-BSD FRML MDRD: 85 ML/MIN/1.73
GLOBULIN UR ELPH-MCNC: 3.5 GM/DL
GLUCOSE BLD-MCNC: 96 MG/DL (ref 65–99)
HCT VFR BLD AUTO: 36.7 % (ref 34–46.6)
HDLC SERPL-MCNC: 70 MG/DL (ref 40–60)
HGB BLD-MCNC: 11.9 G/DL (ref 12–15.9)
IMM GRANULOCYTES # BLD AUTO: 0.06 10*3/MM3 (ref 0–0.05)
IMM GRANULOCYTES NFR BLD AUTO: 0.5 % (ref 0–0.5)
LDLC SERPL CALC-MCNC: 101 MG/DL (ref 0–100)
LDLC/HDLC SERPL: 1.45 {RATIO}
LYMPHOCYTES # BLD AUTO: 3.21 10*3/MM3 (ref 0.7–3.1)
LYMPHOCYTES NFR BLD AUTO: 27 % (ref 19.6–45.3)
MCH RBC QN AUTO: 27.7 PG (ref 26.6–33)
MCHC RBC AUTO-ENTMCNC: 32.4 G/DL (ref 31.5–35.7)
MCV RBC AUTO: 85.3 FL (ref 79–97)
MONOCYTES # BLD AUTO: 0.68 10*3/MM3 (ref 0.1–0.9)
MONOCYTES NFR BLD AUTO: 5.7 % (ref 5–12)
NEUTROPHILS # BLD AUTO: 7.81 10*3/MM3 (ref 1.7–7)
NEUTROPHILS NFR BLD AUTO: 65.6 % (ref 42.7–76)
NRBC BLD AUTO-RTO: 0 /100 WBC (ref 0–0.2)
PLATELET # BLD AUTO: 349 10*3/MM3 (ref 140–450)
PMV BLD AUTO: 9.6 FL (ref 6–12)
POTASSIUM BLD-SCNC: 4.7 MMOL/L (ref 3.5–5.2)
PROT SERPL-MCNC: 7.5 G/DL (ref 6–8.5)
RBC # BLD AUTO: 4.3 10*6/MM3 (ref 3.77–5.28)
SODIUM BLD-SCNC: 145 MMOL/L (ref 136–145)
T4 FREE SERPL-MCNC: 1.25 NG/DL (ref 0.93–1.7)
TRIGL SERPL-MCNC: 73 MG/DL (ref 0–150)
TSH SERPL DL<=0.05 MIU/L-ACNC: 4.29 UIU/ML (ref 0.27–4.2)
VLDLC SERPL-MCNC: 14.6 MG/DL (ref 5–40)
WBC NRBC COR # BLD: 11.91 10*3/MM3 (ref 3.4–10.8)

## 2019-11-14 PROCEDURE — 84439 ASSAY OF FREE THYROXINE: CPT | Performed by: FAMILY MEDICINE

## 2019-11-14 PROCEDURE — 36415 COLL VENOUS BLD VENIPUNCTURE: CPT | Performed by: FAMILY MEDICINE

## 2019-11-14 PROCEDURE — 80053 COMPREHEN METABOLIC PANEL: CPT | Performed by: FAMILY MEDICINE

## 2019-11-14 PROCEDURE — 80061 LIPID PANEL: CPT | Performed by: FAMILY MEDICINE

## 2019-11-14 PROCEDURE — 84443 ASSAY THYROID STIM HORMONE: CPT | Performed by: FAMILY MEDICINE

## 2019-11-14 PROCEDURE — 99214 OFFICE O/P EST MOD 30 MIN: CPT | Performed by: FAMILY MEDICINE

## 2019-11-14 PROCEDURE — 85025 COMPLETE CBC W/AUTO DIFF WBC: CPT | Performed by: FAMILY MEDICINE

## 2019-11-14 RX ORDER — LISINOPRIL 40 MG/1
40 TABLET ORAL DAILY
Qty: 30 TABLET | Refills: 11 | Status: SHIPPED | OUTPATIENT
Start: 2019-11-14 | End: 2020-10-21 | Stop reason: SDUPTHER

## 2019-11-14 RX ORDER — LEVOTHYROXINE SODIUM 75 MCG
75 TABLET ORAL DAILY
Qty: 30 TABLET | Refills: 11 | Status: SHIPPED | OUTPATIENT
Start: 2019-11-14 | End: 2019-12-11

## 2019-11-14 NOTE — PROGRESS NOTES
"Subjective   Sharee Cheek is a 49 y.o. female seen today for Follow-up; Hypertension; Hypothyroidism; and Nail Problem.     History of Present Illness   The patient is here today for a follow up on Hypertension and Hypothyroidism.    States she is doing much better. States she was in the ER on 11/12 for swelling and itching of her tongue. Given Solumedrol and Famotidine IV and an Rx for Medrol dose pack, Famotidine 20 mg, and an Epi pen at discharge. States she had Ibuprofen 2 hours prior to the episode. Has had some since with no problems. Did have eggs and milk on Friday as well.  States one months ago she had an episode with itching and redness of hands and feet. Took Benadryl and that episode resolved.  Denies any chest pain or shortness of breath.    BP today is 130/86.  Taking Lisinopril 40 mg.    Also c/o darkening of the right great toenail. States it was yellow in color and started to use Vasoline and it is now brown in color.    The following portions of the patient's history were reviewed and updated as appropriate: allergies, current medications, past social history and problem list.    Review of Systems   Respiratory: Negative for shortness of breath.    Cardiovascular: Negative for chest pain.   Skin: Negative for rash.        Itching and swelling of tongue have resolved.       Objective   /86   Pulse 71   Temp 97.5 °F (36.4 °C)   Resp 12   Ht 163.8 cm (64.5\")   Wt 110 kg (242 lb)   LMP 11/11/2019 (Exact Date)   SpO2 96%   BMI 40.90 kg/m²   Physical Exam   Constitutional: She appears well-developed and well-nourished.   HENT:   Right Ear: External ear normal.   Left Ear: External ear normal.   Mouth/Throat: Oropharynx is clear and moist.   Cardiovascular: Normal rate and regular rhythm.   Pulmonary/Chest: Effort normal and breath sounds normal.   Nursing note and vitals reviewed.      Assessment/Plan   Problem List Items Addressed This Visit        Cardiovascular and Mediastinum    " Hypertension       Endocrine    Hypothyroidism      Other Visit Diagnoses     Anaphylaxis, subsequent encounter    -  Primary    Onychomycosis of right great toe            The patient has a prior history of angioedema associated with itching and dermatitis.  This time she treated the itching and dermatitis with Benadryl apparently before it developed fully into angioedema.  At this point we do not have a suspect as to causation.  We will check laboratory studies.  She does have onychomycosis of her toenail which is caused it to begin to loosen.  Should her liver functions be normal we will treat her with terbinafine and then recheck again in 4 weeks.    It should be noted that the patient has been on lisinopril for many years and is never had another episode of angioedema since the one episode that occurred 14 years ago.      Drink plenty fluids.    Continue medications as doing.    Refill Lisinopril 40 mg daily #30+11 and Synthroid 75 mcg daily #30+11.    Written Rx for Terbinafine 250 mg daily #28+0.    Check a CMP,TSH and Free T 4. Report results by letter.    Follow up as needed.            Scribed for Dr Suhas Fraser by Darline Lua CMA.          I, Suhas Fraser MD, personally performed the services described in this documentation, as scribed by Darline Lau in my presence, and is both accurate and complete.        (Please note that portions of this note were completed with a voice recognition program. Efforts were made to edit the dictations,but occasionally words are mis transcribed.)

## 2019-11-27 ENCOUNTER — TELEPHONE (OUTPATIENT)
Dept: INTERNAL MEDICINE | Facility: CLINIC | Age: 49
End: 2019-11-27

## 2019-12-02 NOTE — TELEPHONE ENCOUNTER
Is there another day that she would be willing to come in December? I do not see anything open, but thinking to open Dec 20 1/2 day instead of [place] 3. Would she be willing to come on this day?   I am not 100 % about this schedule change but would consider it. Otherwise ask her to give a few dates Dec - Jan and I will look for the cancellations.

## 2019-12-02 NOTE — TELEPHONE ENCOUNTER
Spoke with pt.. She can come in on 12/4, 12/10, 12/11 in the afternoon, 12/20 in the morning, 1/7, or 1/9?     If Dec 20th opens up we can schedule her there... If not, can you work her in on any of the other dates? Thanks!

## 2019-12-09 NOTE — TELEPHONE ENCOUNTER
Pt called back and she states that time would work for her,  Sorry I tried added it but could not.

## 2019-12-11 ENCOUNTER — OFFICE VISIT (OUTPATIENT)
Dept: ENDOCRINOLOGY | Facility: CLINIC | Age: 49
End: 2019-12-11

## 2019-12-11 VITALS
HEIGHT: 65 IN | DIASTOLIC BLOOD PRESSURE: 82 MMHG | HEART RATE: 90 BPM | BODY MASS INDEX: 39.95 KG/M2 | WEIGHT: 239.8 LBS | OXYGEN SATURATION: 98 % | SYSTOLIC BLOOD PRESSURE: 126 MMHG

## 2019-12-11 DIAGNOSIS — E03.9 ACQUIRED HYPOTHYROIDISM: ICD-10-CM

## 2019-12-11 DIAGNOSIS — E03.8 HYPOTHYROIDISM DUE TO HASHIMOTO'S THYROIDITIS: ICD-10-CM

## 2019-12-11 DIAGNOSIS — E04.1 SOLITARY THYROID NODULE: Primary | ICD-10-CM

## 2019-12-11 DIAGNOSIS — E06.3 HYPOTHYROIDISM DUE TO HASHIMOTO'S THYROIDITIS: ICD-10-CM

## 2019-12-11 PROCEDURE — 99213 OFFICE O/P EST LOW 20 MIN: CPT | Performed by: INTERNAL MEDICINE

## 2019-12-11 PROCEDURE — 76536 US EXAM OF HEAD AND NECK: CPT | Performed by: INTERNAL MEDICINE

## 2019-12-11 RX ORDER — LEVOTHYROXINE SODIUM 88 MCG
88 TABLET ORAL DAILY
Qty: 30 TABLET | Refills: 11 | Status: SHIPPED | OUTPATIENT
Start: 2019-12-11 | End: 2020-10-21 | Stop reason: SDUPTHER

## 2019-12-11 RX ORDER — EPINEPHRINE 0.3 MG/.3ML
INJECTION SUBCUTANEOUS
COMMUNITY
Start: 2019-11-13 | End: 2022-01-09 | Stop reason: ALTCHOICE

## 2019-12-11 NOTE — PROGRESS NOTES
Solitary Thyroid Nodule (Follow Up & U/S:  Has noticed more tenderness, and food getting stuck more.  PCP did her TSH labs in November )    Subjective   Sharee Cheek is a 49 y.o. female. she is being seen for follow-up  Hypothyroidism dx several years ago.  Levothyroxine 75 mcg. Last TSH was 3.5. Patient has been on the same dose for several years, after switching to Synthroid brand she is feeling better. Takes Synthroid 75 mcg now.      Thyroid nodule noted on us in 2014 - right 1.1 cm nodule. Patient reported sx of tenderness in the lower portion of the thyroid and swallowing difficulties.     C/o fatigue, soreness in the lower neck.     History of Present Illness  The following portions of the patient's history were reviewed and updated as appropriate: She  has a past medical history of History of conjunctivitis, History of influenza, History of sinusitis, Hypertension, and Insomnia.  She  has a past surgical history that includes Laparoscopic cholecystectomy; diagnostic laparoscopy exploratory laparotomy; hysteroscopy endometrial ablation; and Tonsillectomy and adenoidectomy.  Her family history includes Breast cancer in her maternal grandmother and mother; COPD in an other family member; Diabetes in her maternal grandmother; Ovarian cancer in her maternal grandmother; Polycystic ovary syndrome in her maternal grandmother.  She  reports that she has never smoked. She has never used smokeless tobacco. She reports that she does not drink alcohol or use drugs.  She is allergic to ibuprofen; acetaminophen; keflex [cephalexin]; and penicillins..    Medications:    Current Outpatient Medications:   •  EPINEPHrine (EPIPEN) 0.3 MG/0.3ML solution auto-injector injection, , Disp: , Rfl:   •  lisinopril (PRINIVIL,ZESTRIL) 40 MG tablet, Take 1 tablet by mouth Daily., Disp: 30 tablet, Rfl: 11  •  SYNTHROID 75 MCG tablet, Take 1 tablet by mouth Daily., Disp: 30 tablet, Rfl: 11    The following portions of the patient's  "history were reviewed and updated as appropriate: allergies, current medications, past family history, past medical history, past social history, past surgical history and problem list.    Review of Systems   HENT: Positive for trouble swallowing.    Endocrine: Positive for cold intolerance.   Genitourinary: Positive for menstrual problem.   Psychiatric/Behavioral: Positive for sleep disturbance. The patient is nervous/anxious.    All other systems reviewed and are negative.      Objective   Blood pressure 126/82, pulse 90, height 163.8 cm (64.5\"), weight 109 kg (239 lb 12.8 oz), last menstrual period 11/11/2019, SpO2 98 %.   Physical Exam   Constitutional: She is oriented to person, place, and time. She appears well-developed and well-nourished.   Obese,  Facial hair on the chin and lip noted.    HENT:   Head: Normocephalic and atraumatic.   Eyes: Conjunctivae are normal.   Neck: Thyromegaly (right 1 cm nodule, tender on palpation ) present.   Cardiovascular: Normal rate, regular rhythm, normal heart sounds and intact distal pulses.   Pulmonary/Chest: Effort normal and breath sounds normal.   Musculoskeletal: She exhibits no edema.   Lymphadenopathy:     She has no cervical adenopathy.   Neurological: She is alert and oriented to person, place, and time. She has normal reflexes.   Skin: No rash noted.   Psychiatric: She has a normal mood and affect. Thought content normal.   Nursing note and vitals reviewed.        Results for orders placed or performed in visit on 11/14/19   Comprehensive Metabolic Panel   Result Value Ref Range    Glucose 96 65 - 99 mg/dL    BUN 15 6 - 20 mg/dL    Creatinine 0.73 0.57 - 1.00 mg/dL    Sodium 145 136 - 145 mmol/L    Potassium 4.7 3.5 - 5.2 mmol/L    Chloride 105 98 - 107 mmol/L    CO2 31.8 (H) 22.0 - 29.0 mmol/L    Calcium 8.9 8.6 - 10.5 mg/dL    Total Protein 7.5 6.0 - 8.5 g/dL    Albumin 4.00 3.50 - 5.20 g/dL    ALT (SGPT) 11 1 - 33 U/L    AST (SGOT) 13 1 - 32 U/L    Alkaline " Phosphatase 75 39 - 117 U/L    Total Bilirubin 0.2 0.2 - 1.2 mg/dL    eGFR Non African Amer 85 >60 mL/min/1.73    Globulin 3.5 gm/dL    A/G Ratio 1.1 g/dL    BUN/Creatinine Ratio 20.5 7.0 - 25.0    Anion Gap 8.2 5.0 - 15.0 mmol/L   T4, Free   Result Value Ref Range    Free T4 1.25 0.93 - 1.70 ng/dL   TSH   Result Value Ref Range    TSH 4.290 (H) 0.270 - 4.200 uIU/mL   Lipid Panel   Result Value Ref Range    Total Cholesterol 186 0 - 200 mg/dL    Triglycerides 73 0 - 150 mg/dL    HDL Cholesterol 70 (H) 40 - 60 mg/dL    LDL Cholesterol  101 (H) 0 - 100 mg/dL    VLDL Cholesterol 14.6 5 - 40 mg/dL    LDL/HDL Ratio 1.45    CBC Auto Differential   Result Value Ref Range    WBC 11.91 (H) 3.40 - 10.80 10*3/mm3    RBC 4.30 3.77 - 5.28 10*6/mm3    Hemoglobin 11.9 (L) 12.0 - 15.9 g/dL    Hematocrit 36.7 34.0 - 46.6 %    MCV 85.3 79.0 - 97.0 fL    MCH 27.7 26.6 - 33.0 pg    MCHC 32.4 31.5 - 35.7 g/dL    RDW 13.8 12.3 - 15.4 %    RDW-SD 42.7 37.0 - 54.0 fl    MPV 9.6 6.0 - 12.0 fL    Platelets 349 140 - 450 10*3/mm3    Neutrophil % 65.6 42.7 - 76.0 %    Lymphocyte % 27.0 19.6 - 45.3 %    Monocyte % 5.7 5.0 - 12.0 %    Eosinophil % 0.8 0.3 - 6.2 %    Basophil % 0.4 0.0 - 1.5 %    Immature Grans % 0.5 0.0 - 0.5 %    Neutrophils, Absolute 7.81 (H) 1.70 - 7.00 10*3/mm3    Lymphocytes, Absolute 3.21 (H) 0.70 - 3.10 10*3/mm3    Monocytes, Absolute 0.68 0.10 - 0.90 10*3/mm3    Eosinophils, Absolute 0.10 0.00 - 0.40 10*3/mm3    Basophils, Absolute 0.05 0.00 - 0.20 10*3/mm3    Immature Grans, Absolute 0.06 (H) 0.00 - 0.05 10*3/mm3    nRBC 0.0 0.0 - 0.2 /100 WBC             Thyroid ultrasound 12/11/19              Real time high resolution imaging of the thyroid gland was performed in transverse and longitudinal planes.      The right lobe measured 4.4 cm L x 1.8 cm AP x 2.23 cm in TV dimension.    The isthmus measured 0.29 cm in thickness.    The left thyroid lobe measured 4.06 cm L x 1.27 cm AP x 1.1 cm in TV dimension.    Thyroid gland  is heterogeneous and contains single nodule.    Nodule 1   is located in the right mid lobe and measures 1.42 x 0.88 x 1.38 cm (L X AP X TV).  This nodule is solid, homogeneous, hyperechoic with well-defined margins, and Grade II vascularity on Color Flow Doppler.  It has no artifacts      No pathologic lymph nodes were seen.      Assessment: Right dominant nodule with benign u/s characteristics - no indication for FNA, the nodule is stable.   Repeat ultrasound in 12-18 months.       Assessment/Plan:    Problem List Items Addressed This Visit        Endocrine    Hypothyroidism      Other Visit Diagnoses     Solitary thyroid nodule    -  Primary    Relevant Orders    US Thyroid (Completed)      Right thyroid nodule is benign - no indications for FNA.   Recent labs reviewed and thyroid function is low - increase synthroid to 88 mcg. Repeat labs in 2 months   Images of current u/s reviewed and compared to 2014 and 2018 exam - appearance of the nodule hasn't changed.     F/u in 15 months with u/s.

## 2020-03-03 ENCOUNTER — OFFICE VISIT (OUTPATIENT)
Dept: FAMILY MEDICINE CLINIC | Facility: CLINIC | Age: 50
End: 2020-03-03

## 2020-03-03 VITALS
TEMPERATURE: 99 F | SYSTOLIC BLOOD PRESSURE: 142 MMHG | OXYGEN SATURATION: 98 % | HEART RATE: 97 BPM | BODY MASS INDEX: 39.65 KG/M2 | RESPIRATION RATE: 16 BRPM | HEIGHT: 65 IN | WEIGHT: 238 LBS | DIASTOLIC BLOOD PRESSURE: 85 MMHG

## 2020-03-03 DIAGNOSIS — J01.80 OTHER ACUTE SINUSITIS, RECURRENCE NOT SPECIFIED: ICD-10-CM

## 2020-03-03 DIAGNOSIS — R52 BODY ACHES: Primary | ICD-10-CM

## 2020-03-03 DIAGNOSIS — R50.9 FEVER, UNSPECIFIED FEVER CAUSE: ICD-10-CM

## 2020-03-03 LAB
EXPIRATION DATE: NORMAL
EXPIRATION DATE: NORMAL
FLUAV AG NPH QL: NEGATIVE
FLUBV AG NPH QL: NEGATIVE
INTERNAL CONTROL: NORMAL
INTERNAL CONTROL: NORMAL
Lab: NORMAL
Lab: NORMAL
S PYO AG THROAT QL: NEGATIVE

## 2020-03-03 PROCEDURE — 87804 INFLUENZA ASSAY W/OPTIC: CPT | Performed by: NURSE PRACTITIONER

## 2020-03-03 PROCEDURE — 87880 STREP A ASSAY W/OPTIC: CPT | Performed by: NURSE PRACTITIONER

## 2020-03-03 PROCEDURE — 99213 OFFICE O/P EST LOW 20 MIN: CPT | Performed by: NURSE PRACTITIONER

## 2020-03-03 RX ORDER — AZITHROMYCIN 250 MG/1
TABLET, FILM COATED ORAL
Qty: 6 TABLET | Refills: 0 | Status: SHIPPED | OUTPATIENT
Start: 2020-03-03 | End: 2021-03-02

## 2020-03-03 NOTE — PROGRESS NOTES
Subjective   Sharee Cheek is a 49 y.o. female.     History of Present Illness Exposed to influenza 3 days ago. Complains of sneezing, sinus congestion, clear to yellow nasal discharge, myaliga, low grade temp. Left side of face is achey. Worse if leaning over. Treated with flonase and allegra-D. Not sleeping well. Denies cough, sob and wheezing.      Outpatient Encounter Medications as of 3/3/2020   Medication Sig Dispense Refill   • EPINEPHrine (EPIPEN) 0.3 MG/0.3ML solution auto-injector injection      • lisinopril (PRINIVIL,ZESTRIL) 40 MG tablet Take 1 tablet by mouth Daily. 30 tablet 11   • SYNTHROID 88 MCG tablet Take 1 tablet by mouth Daily. 30 tablet 11     No facility-administered encounter medications on file as of 3/3/2020.        The following portions of the patient's history were reviewed and updated as appropriate: allergies, current medications, past family history, past medical history, past social history, past surgical history and problem list.    Review of Systems   Constitutional: Negative for appetite change, fever, unexpected weight gain and unexpected weight loss.   HENT: Positive for congestion and sneezing. Negative for nosebleeds, sore throat and trouble swallowing.    Eyes: Negative for visual disturbance.   Respiratory: Negative for cough, shortness of breath and wheezing.    Cardiovascular: Negative for chest pain, palpitations and leg swelling.   Gastrointestinal: Negative for abdominal pain, blood in stool, constipation, diarrhea, nausea and vomiting.   Endocrine: Negative for polydipsia, polyphagia and polyuria.   Genitourinary: Negative for dysuria, frequency and hematuria.   Musculoskeletal: Positive for myalgias. Negative for arthralgias and joint swelling.   Skin: Negative for rash.   Neurological: Positive for headache. Negative for dizziness, seizures, syncope and numbness.   Hematological: Negative for adenopathy. Does not bruise/bleed easily.   Psychiatric/Behavioral:  "Negative for behavioral problems, sleep disturbance and depressed mood. The patient is not nervous/anxious.        Objective     Visit Vitals  /85 (BP Location: Right arm, Patient Position: Sitting, Cuff Size: Adult)   Pulse 97   Temp 99 °F (37.2 °C) (Oral)   Resp 16   Ht 163.8 cm (64.5\")   Wt 108 kg (238 lb)   SpO2 98%   BMI 40.22 kg/m²       Physical Exam   Constitutional: She is oriented to person, place, and time. She appears well-developed and well-nourished. No distress.   HENT:   Head: Normocephalic and atraumatic.   Right Ear: Tympanic membrane and external ear normal.   Left Ear: Tympanic membrane and external ear normal.   Nose: Left sinus exhibits maxillary sinus tenderness.   Mouth/Throat: Oropharynx is clear and moist. No oropharyngeal exudate.   Eyes: Pupils are equal, round, and reactive to light. Conjunctivae are normal. Right eye exhibits no discharge. Left eye exhibits no discharge. No scleral icterus.   Neck: Neck supple. No tracheal deviation present. No thyromegaly present.   Cardiovascular: Normal rate, regular rhythm and normal heart sounds. Exam reveals no gallop and no friction rub.   No murmur heard.  Pulmonary/Chest: Effort normal and breath sounds normal. No respiratory distress. She has no wheezes.   Abdominal: Soft. Bowel sounds are normal. She exhibits no distension and no mass. There is no tenderness.   Musculoskeletal: She exhibits no edema or deformity.   Lymphadenopathy:     She has no cervical adenopathy.   Neurological: She is alert and oriented to person, place, and time. Coordination normal.   Skin: Skin is warm and dry. Capillary refill takes less than 2 seconds. No rash noted. No erythema.   Psychiatric: She has a normal mood and affect. Her speech is normal and behavior is normal. Judgment and thought content normal.   Nursing note and vitals reviewed.        Assessment/Plan   Sharee was seen today for generalized body aches, fever and nasal congestion.    Diagnoses " and all orders for this visit:    Body aches  -     POC Influenza A / B    Fever, unspecified fever cause  -     POC Rapid Strep A    Other acute sinusitis, recurrence not specified  -     azithromycin (ZITHROMAX) 250 MG tablet; Take 2 tablets the first day, then 1 tablet daily for 4 days.    Continue current treatment.  Follow up symptoms persist or worsen.

## 2020-10-01 ENCOUNTER — OFFICE VISIT (OUTPATIENT)
Dept: ORTHOPEDIC SURGERY | Facility: CLINIC | Age: 50
End: 2020-10-01

## 2020-10-01 VITALS — OXYGEN SATURATION: 97 % | HEART RATE: 111 BPM | WEIGHT: 229 LBS | BODY MASS INDEX: 39.09 KG/M2 | HEIGHT: 64 IN

## 2020-10-01 DIAGNOSIS — M75.42 IMPINGEMENT SYNDROME OF LEFT SHOULDER: ICD-10-CM

## 2020-10-01 DIAGNOSIS — G89.29 CHRONIC LEFT SHOULDER PAIN: ICD-10-CM

## 2020-10-01 DIAGNOSIS — M75.02 ADHESIVE CAPSULITIS OF LEFT SHOULDER: Primary | ICD-10-CM

## 2020-10-01 DIAGNOSIS — M75.52 BURSITIS OF LEFT SHOULDER: ICD-10-CM

## 2020-10-01 DIAGNOSIS — M25.512 CHRONIC LEFT SHOULDER PAIN: ICD-10-CM

## 2020-10-01 PROCEDURE — 99204 OFFICE O/P NEW MOD 45 MIN: CPT | Performed by: ORTHOPAEDIC SURGERY

## 2020-10-01 RX ORDER — METHYLPREDNISOLONE 4 MG/1
TABLET ORAL
Qty: 1 EACH | Refills: 0 | Status: SHIPPED | OUTPATIENT
Start: 2020-10-01 | End: 2021-03-02

## 2020-10-01 RX ORDER — METHOCARBAMOL 500 MG/1
500 TABLET, FILM COATED ORAL 3 TIMES DAILY PRN
Qty: 40 TABLET | Refills: 1 | Status: SHIPPED | OUTPATIENT
Start: 2020-10-01 | End: 2020-10-15
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
## 2020-10-15 ENCOUNTER — HOSPITAL ENCOUNTER (OUTPATIENT)
Dept: MRI IMAGING | Facility: HOSPITAL | Age: 50
Discharge: HOME | End: 2020-10-15
Admitting: ORTHOPAEDIC SURGERY

## 2020-10-15 DIAGNOSIS — M75.02 ADHESIVE CAPSULITIS OF LEFT SHOULDER: ICD-10-CM

## 2020-10-15 DIAGNOSIS — M75.52 BURSITIS OF LEFT SHOULDER: ICD-10-CM

## 2020-10-15 DIAGNOSIS — M75.42 IMPINGEMENT SYNDROME OF LEFT SHOULDER: ICD-10-CM

## 2020-10-15 PROCEDURE — 73221 MRI JOINT UPR EXTREM W/O DYE: CPT

## 2020-10-20 ENCOUNTER — OFFICE VISIT (OUTPATIENT)
Dept: ORTHOPEDIC SURGERY | Facility: CLINIC | Age: 50
End: 2020-10-20

## 2020-10-20 VITALS — HEART RATE: 95 BPM | HEIGHT: 64 IN | BODY MASS INDEX: 39.14 KG/M2 | OXYGEN SATURATION: 99 % | WEIGHT: 229.28 LBS

## 2020-10-20 DIAGNOSIS — M25.512 CHRONIC LEFT SHOULDER PAIN: ICD-10-CM

## 2020-10-20 DIAGNOSIS — M75.52 BURSITIS OF LEFT SHOULDER: ICD-10-CM

## 2020-10-20 DIAGNOSIS — M75.02 ADHESIVE CAPSULITIS OF LEFT SHOULDER: Primary | ICD-10-CM

## 2020-10-20 DIAGNOSIS — G89.29 CHRONIC LEFT SHOULDER PAIN: ICD-10-CM

## 2020-10-20 DIAGNOSIS — M75.42 IMPINGEMENT SYNDROME OF LEFT SHOULDER: ICD-10-CM

## 2020-10-20 PROCEDURE — 99213 OFFICE O/P EST LOW 20 MIN: CPT | Performed by: ORTHOPAEDIC SURGERY
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
## 2020-10-21 DIAGNOSIS — I10 ESSENTIAL HYPERTENSION: ICD-10-CM

## 2020-10-21 RX ORDER — LISINOPRIL 40 MG/1
40 TABLET ORAL DAILY
Qty: 30 TABLET | Refills: 3 | Status: SHIPPED | OUTPATIENT
Start: 2020-10-21 | End: 2021-03-30

## 2020-10-25 RX ORDER — LEVOTHYROXINE SODIUM 88 MCG
88 TABLET ORAL DAILY
Qty: 30 TABLET | Refills: 11 | Status: SHIPPED | OUTPATIENT
Start: 2020-10-25 | End: 2021-03-14

## 2020-11-02 ENCOUNTER — APPOINTMENT (OUTPATIENT)
Dept: GENERAL RADIOLOGY | Facility: HOSPITAL | Age: 50
End: 2020-11-02

## 2020-11-27 DIAGNOSIS — I10 ESSENTIAL HYPERTENSION: ICD-10-CM

## 2020-11-27 RX ORDER — LISINOPRIL 40 MG/1
TABLET ORAL
Qty: 30 TABLET | Refills: 10 | OUTPATIENT
Start: 2020-11-27

## 2020-12-03 ENCOUNTER — HOSPITAL ENCOUNTER (OUTPATIENT)
Dept: GENERAL RADIOLOGY | Facility: HOSPITAL | Age: 50
Discharge: HOME | End: 2020-12-03
Admitting: RADIOLOGY

## 2020-12-03 DIAGNOSIS — M25.512 CHRONIC LEFT SHOULDER PAIN: ICD-10-CM

## 2020-12-03 DIAGNOSIS — G89.29 CHRONIC LEFT SHOULDER PAIN: ICD-10-CM

## 2020-12-03 DIAGNOSIS — M75.42 IMPINGEMENT SYNDROME OF LEFT SHOULDER: ICD-10-CM

## 2020-12-03 DIAGNOSIS — M75.02 ADHESIVE CAPSULITIS OF LEFT SHOULDER: ICD-10-CM

## 2020-12-03 DIAGNOSIS — M75.52 BURSITIS OF LEFT SHOULDER: ICD-10-CM

## 2020-12-03 PROCEDURE — 25010000002 IOPAMIDOL 61 % SOLUTION: Performed by: ORTHOPAEDIC SURGERY

## 2020-12-03 PROCEDURE — 25010000002 METHYLPREDNISOLONE PER 40 MG: Performed by: ORTHOPAEDIC SURGERY

## 2020-12-03 PROCEDURE — 77002 NEEDLE LOCALIZATION BY XRAY: CPT

## 2020-12-03 PROCEDURE — 25010000002 METHYLPREDNISOLONE PER 80 MG: Performed by: ORTHOPAEDIC SURGERY

## 2020-12-03 RX ORDER — METHYLPREDNISOLONE ACETATE 40 MG/ML
40 INJECTION, SUSPENSION INTRA-ARTICULAR; INTRALESIONAL; INTRAMUSCULAR; SOFT TISSUE ONCE
Status: DISCONTINUED | OUTPATIENT
Start: 2020-12-03 | End: 2020-12-03

## 2020-12-03 RX ORDER — METHYLPREDNISOLONE ACETATE 80 MG/ML
80 INJECTION, SUSPENSION INTRA-ARTICULAR; INTRALESIONAL; INTRAMUSCULAR; SOFT TISSUE ONCE
Status: COMPLETED | OUTPATIENT
Start: 2020-12-03 | End: 2020-12-03

## 2020-12-03 RX ORDER — LIDOCAINE HYDROCHLORIDE 10 MG/ML
4 INJECTION, SOLUTION EPIDURAL; INFILTRATION; INTRACAUDAL; PERINEURAL ONCE
Status: COMPLETED | OUTPATIENT
Start: 2020-12-03 | End: 2020-12-03

## 2020-12-03 RX ORDER — LIDOCAINE HYDROCHLORIDE 10 MG/ML
5 INJECTION, SOLUTION EPIDURAL; INFILTRATION; INTRACAUDAL; PERINEURAL ONCE
Status: COMPLETED | OUTPATIENT
Start: 2020-12-03 | End: 2020-12-03

## 2020-12-03 RX ADMIN — IOPAMIDOL 10 ML: 612 INJECTION, SOLUTION INTRAVENOUS at 14:04

## 2020-12-03 RX ADMIN — METHYLPREDNISOLONE ACETATE 80 MG: 80 INJECTION, SUSPENSION INTRA-ARTICULAR; INTRALESIONAL; INTRAMUSCULAR; SOFT TISSUE at 14:05

## 2020-12-03 RX ADMIN — METHYLPREDNISOLONE ACETATE 40 MG: 40 INJECTION, SUSPENSION INTRA-ARTICULAR; INTRALESIONAL; INTRAMUSCULAR; SOFT TISSUE at 14:04

## 2020-12-03 RX ADMIN — LIDOCAINE HYDROCHLORIDE 5 ML: 10 INJECTION, SOLUTION EPIDURAL; INFILTRATION; INTRACAUDAL; PERINEURAL at 14:04

## 2020-12-03 RX ADMIN — LIDOCAINE HYDROCHLORIDE 4 ML: 10 INJECTION, SOLUTION EPIDURAL; INFILTRATION; INTRACAUDAL; PERINEURAL at 14:04

## 2021-03-02 ENCOUNTER — OFFICE VISIT (OUTPATIENT)
Dept: ENDOCRINOLOGY | Facility: CLINIC | Age: 51
End: 2021-03-02

## 2021-03-02 ENCOUNTER — LAB (OUTPATIENT)
Dept: LAB | Facility: HOSPITAL | Age: 51
End: 2021-03-02

## 2021-03-02 VITALS
DIASTOLIC BLOOD PRESSURE: 82 MMHG | HEIGHT: 64 IN | WEIGHT: 231.1 LBS | SYSTOLIC BLOOD PRESSURE: 128 MMHG | HEART RATE: 71 BPM | TEMPERATURE: 96.9 F | OXYGEN SATURATION: 98 % | BODY MASS INDEX: 39.46 KG/M2

## 2021-03-02 DIAGNOSIS — E03.8 HYPOTHYROIDISM DUE TO HASHIMOTO'S THYROIDITIS: ICD-10-CM

## 2021-03-02 DIAGNOSIS — E06.3 HYPOTHYROIDISM DUE TO HASHIMOTO'S THYROIDITIS: ICD-10-CM

## 2021-03-02 DIAGNOSIS — E04.1 THYROID NODULE: Primary | ICD-10-CM

## 2021-03-02 LAB
T4 FREE SERPL-MCNC: 1.19 NG/DL (ref 0.93–1.7)
TSH SERPL DL<=0.05 MIU/L-ACNC: 4.53 UIU/ML (ref 0.27–4.2)

## 2021-03-02 PROCEDURE — 76536 US EXAM OF HEAD AND NECK: CPT | Performed by: INTERNAL MEDICINE

## 2021-03-02 PROCEDURE — 99214 OFFICE O/P EST MOD 30 MIN: CPT | Performed by: INTERNAL MEDICINE

## 2021-03-02 PROCEDURE — 84439 ASSAY OF FREE THYROXINE: CPT | Performed by: INTERNAL MEDICINE

## 2021-03-02 PROCEDURE — 84443 ASSAY THYROID STIM HORMONE: CPT | Performed by: INTERNAL MEDICINE

## 2021-03-02 NOTE — PROGRESS NOTES
"Solitary Thyroid Nodule (Follow Up & U/S)    Subjective   Sharee Cheek is a 50 y.o. female. she is being seen for follow-up  Hypothyroidism dx several years ago.  Levothyroxine 75 mcg. Last TSH was 3.5. Patient has been on the same dose for several years, after switching to Synthroid brand she is feeling better. Takes Synthroid 88 mcg now.      Thyroid nodule noted on us in 2014 - right 1.1 cm nodule. Patient reported sx of tenderness in the lower portion of the thyroid and swallowing difficulties.       Medications:    Current Outpatient Medications:   •  EPINEPHrine (EPIPEN) 0.3 MG/0.3ML solution auto-injector injection, , Disp: , Rfl:   •  lisinopril (PRINIVIL,ZESTRIL) 40 MG tablet, Take 1 tablet by mouth Daily., Disp: 30 tablet, Rfl: 3  •  Synthroid 88 MCG tablet, Take 1 tablet by mouth Daily., Disp: 30 tablet, Rfl: 11      Review of Systems   HENT: Positive for trouble swallowing.    Endocrine: Positive for cold intolerance.   Genitourinary: Positive for menstrual problem.   Psychiatric/Behavioral: Positive for sleep disturbance. The patient is nervous/anxious.    All other systems reviewed and are negative.      Objective   Blood pressure 128/82, pulse 71, temperature 96.9 °F (36.1 °C), height 163.8 cm (64.49\"), weight 105 kg (231 lb 1.6 oz), SpO2 98 %.   Physical Exam   Constitutional: She is oriented to person, place, and time. She appears well-developed and well-nourished.   Obese,  Facial hair on the chin and lip noted.    HENT:   Head: Normocephalic and atraumatic.   Eyes: Conjunctivae are normal.   Neck: Thyromegaly (right 1 cm nodule, tender on palpation ) present.   Cardiovascular: Normal rate, regular rhythm and normal heart sounds.   Pulmonary/Chest: Effort normal and breath sounds normal.   Lymphadenopathy:     She has no cervical adenopathy.   Neurological: She is alert and oriented to person, place, and time. She has normal reflexes.   Skin: No rash noted.   Psychiatric: Thought content " normal.   Nursing note and vitals reviewed.        Results for orders placed or performed in visit on 03/03/20   POC Rapid Strep A    Specimen: Swab   Result Value Ref Range    Rapid Strep A Screen Negative Negative, VALID, INVALID, Not Performed    Internal Control Passed Passed    Lot Number [id number]     Expiration Date 8/5/22    POC Influenza A / B    Specimen: Swab   Result Value Ref Range    Rapid Influenza A Ag Negative Negative    Rapid Influenza B Ag Negative Negative    Internal Control Passed Passed    Lot Number [id number]     Expiration Date 5/6/2022              Thyroid ultrasound 03/02/21            Real time high resolution imaging of the thyroid gland was performed in transverse and longitudinal planes.      The right lobe measured 4.8 cm L x 2.17 cm AP x 2.31 cm in TV dimension.    The isthmus measured 0.22 cm in thickness.    The left thyroid lobe measured 4.5 cm L x 1.62 cm AP x 1.76 cm in TV dimension.    Thyroid gland is heterogeneous and contains single nodule.    Nodule 1   is located in the right mid lobe and measures 1.4 x 0.76 x 1.29 cm (L X AP X TV).  This nodule is solid, homogeneous, hyperechoic with well-defined margins, and Grade II vascularity on Color Flow Doppler.  It has no artifacts      No pathologic lymph nodes were seen.      Assessment: Right dominant nodule with benign u/s characteristics - no indication for FNA, the nodule is stable.   Repeat ultrasound in 12-18 months.       Assessment/Plan:    Problem List Items Addressed This Visit        Other    Hypothyroidism    Relevant Orders    TSH    T4, Free    Thyroid nodule - Primary    Relevant Orders    US Thyroid (Completed)      Right thyroid nodule is benign - no indications for FNA.   Images of current u/s reviewed and compared to 2014 and 2018 exam - appearance of the nodule hasn't changed.   Ordered thyroid labs today   F/u in 15 months with u/s.

## 2021-03-14 RX ORDER — LEVOTHYROXINE SODIUM 100 MCG
100 TABLET ORAL DAILY
Qty: 30 TABLET | Refills: 11 | Status: SHIPPED | OUTPATIENT
Start: 2021-03-14 | End: 2022-04-05 | Stop reason: SDUPTHER

## 2021-03-30 DIAGNOSIS — I10 ESSENTIAL HYPERTENSION: ICD-10-CM

## 2021-03-30 RX ORDER — LISINOPRIL 40 MG/1
TABLET ORAL
Qty: 30 TABLET | Refills: 0 | Status: SHIPPED | OUTPATIENT
Start: 2021-03-30 | End: 2021-05-06 | Stop reason: SDUPTHER

## 2021-05-05 DIAGNOSIS — I10 ESSENTIAL HYPERTENSION: ICD-10-CM

## 2021-05-05 RX ORDER — LISINOPRIL 40 MG/1
TABLET ORAL
Qty: 30 TABLET | Refills: 0 | OUTPATIENT
Start: 2021-05-05

## 2021-05-06 DIAGNOSIS — I10 ESSENTIAL HYPERTENSION: ICD-10-CM

## 2021-05-06 RX ORDER — LISINOPRIL 40 MG/1
40 TABLET ORAL DAILY
Qty: 15 TABLET | Refills: 0 | Status: SHIPPED | OUTPATIENT
Start: 2021-05-06 | End: 2021-05-25 | Stop reason: SDUPTHER

## 2021-05-25 DIAGNOSIS — I10 ESSENTIAL HYPERTENSION: ICD-10-CM

## 2021-05-25 RX ORDER — LISINOPRIL 40 MG/1
40 TABLET ORAL DAILY
Qty: 15 TABLET | Refills: 0 | Status: SHIPPED | OUTPATIENT
Start: 2021-05-25 | End: 2021-06-08 | Stop reason: SDUPTHER

## 2021-06-02 ENCOUNTER — OFFICE VISIT (OUTPATIENT)
Dept: FAMILY MEDICINE CLINIC | Facility: CLINIC | Age: 51
End: 2021-06-02

## 2021-06-02 VITALS
OXYGEN SATURATION: 99 % | BODY MASS INDEX: 40.05 KG/M2 | HEIGHT: 64 IN | SYSTOLIC BLOOD PRESSURE: 130 MMHG | DIASTOLIC BLOOD PRESSURE: 80 MMHG | WEIGHT: 234.6 LBS | HEART RATE: 73 BPM | TEMPERATURE: 97.5 F | RESPIRATION RATE: 16 BRPM

## 2021-06-02 DIAGNOSIS — E03.9 ACQUIRED HYPOTHYROIDISM: ICD-10-CM

## 2021-06-02 DIAGNOSIS — Z12.11 ENCOUNTER FOR COLORECTAL CANCER SCREENING: ICD-10-CM

## 2021-06-02 DIAGNOSIS — B35.1 ONYCHOMYCOSIS: ICD-10-CM

## 2021-06-02 DIAGNOSIS — M75.02 ADHESIVE CAPSULITIS OF LEFT SHOULDER: ICD-10-CM

## 2021-06-02 DIAGNOSIS — I10 ESSENTIAL HYPERTENSION: Primary | ICD-10-CM

## 2021-06-02 DIAGNOSIS — Z12.31 ENCOUNTER FOR SCREENING MAMMOGRAM FOR MALIGNANT NEOPLASM OF BREAST: ICD-10-CM

## 2021-06-02 DIAGNOSIS — Z12.12 ENCOUNTER FOR COLORECTAL CANCER SCREENING: ICD-10-CM

## 2021-06-02 DIAGNOSIS — Z00.00 ANNUAL PHYSICAL EXAM: ICD-10-CM

## 2021-06-02 LAB
ALBUMIN SERPL-MCNC: 4.2 G/DL (ref 3.5–5.2)
ALBUMIN/GLOB SERPL: 1.2 G/DL
ALP SERPL-CCNC: 96 U/L (ref 39–117)
ALT SERPL W P-5'-P-CCNC: 12 U/L (ref 1–33)
ANION GAP SERPL CALCULATED.3IONS-SCNC: 8 MMOL/L (ref 5–15)
AST SERPL-CCNC: 15 U/L (ref 1–32)
BILIRUB SERPL-MCNC: 0.3 MG/DL (ref 0–1.2)
BUN SERPL-MCNC: 10 MG/DL (ref 6–20)
BUN/CREAT SERPL: 12.8 (ref 7–25)
CALCIUM SPEC-SCNC: 9.7 MG/DL (ref 8.6–10.5)
CHLORIDE SERPL-SCNC: 103 MMOL/L (ref 98–107)
CHOLEST SERPL-MCNC: 211 MG/DL (ref 0–200)
CO2 SERPL-SCNC: 28 MMOL/L (ref 22–29)
CREAT SERPL-MCNC: 0.78 MG/DL (ref 0.57–1)
GFR SERPL CREATININE-BSD FRML MDRD: 78 ML/MIN/1.73
GLOBULIN UR ELPH-MCNC: 3.4 GM/DL
GLUCOSE SERPL-MCNC: 118 MG/DL (ref 65–99)
HBA1C MFR BLD: 5.95 % (ref 4.8–5.6)
HCV AB SER DONR QL: NORMAL
HDLC SERPL-MCNC: 67 MG/DL (ref 40–60)
HIV1+2 AB SER QL: NORMAL
LDLC SERPL CALC-MCNC: 128 MG/DL (ref 0–100)
LDLC/HDLC SERPL: 1.89 {RATIO}
POTASSIUM SERPL-SCNC: 4.7 MMOL/L (ref 3.5–5.2)
PROT SERPL-MCNC: 7.6 G/DL (ref 6–8.5)
SODIUM SERPL-SCNC: 139 MMOL/L (ref 136–145)
TRIGL SERPL-MCNC: 88 MG/DL (ref 0–150)
TSH SERPL DL<=0.05 MIU/L-ACNC: 3.1 UIU/ML (ref 0.27–4.2)
VLDLC SERPL-MCNC: 16 MG/DL (ref 5–40)

## 2021-06-02 PROCEDURE — 99214 OFFICE O/P EST MOD 30 MIN: CPT | Performed by: FAMILY MEDICINE

## 2021-06-02 PROCEDURE — 99396 PREV VISIT EST AGE 40-64: CPT | Performed by: FAMILY MEDICINE

## 2021-06-02 PROCEDURE — 84443 ASSAY THYROID STIM HORMONE: CPT | Performed by: FAMILY MEDICINE

## 2021-06-02 PROCEDURE — 83036 HEMOGLOBIN GLYCOSYLATED A1C: CPT | Performed by: FAMILY MEDICINE

## 2021-06-02 PROCEDURE — G0432 EIA HIV-1/HIV-2 SCREEN: HCPCS | Performed by: FAMILY MEDICINE

## 2021-06-02 PROCEDURE — 80053 COMPREHEN METABOLIC PANEL: CPT | Performed by: FAMILY MEDICINE

## 2021-06-02 PROCEDURE — 80061 LIPID PANEL: CPT | Performed by: FAMILY MEDICINE

## 2021-06-02 PROCEDURE — 86803 HEPATITIS C AB TEST: CPT | Performed by: FAMILY MEDICINE

## 2021-06-02 RX ORDER — FLUCONAZOLE 150 MG/1
TABLET ORAL
Qty: 4 TABLET | Refills: 5 | Status: SHIPPED | OUTPATIENT
Start: 2021-06-02 | End: 2022-01-09 | Stop reason: ALTCHOICE

## 2021-06-02 NOTE — PATIENT INSTRUCTIONS
MyPlate from USDA    MyPlate is an outline of a general healthy diet based on the 2010 Dietary Guidelines for Americans, from the U.S. Department of Agriculture (USDA). It sets guidelines for how much food you should eat from each food group based on your age, sex, and level of physical activity.  What are tips for following MyPlate?  To follow MyPlate recommendations:  · Eat a wide variety of fruits and vegetables, grains, and protein foods.  · Serve smaller portions and eat less food throughout the day.  · Limit portion sizes to avoid overeating.  · Enjoy your food.  · Get at least 150 minutes of exercise every week. This is about 30 minutes each day, 5 or more days per week.  It can be difficult to have every meal look like MyPlate. Think about MyPlate as eating guidelines for an entire day, rather than each individual meal.  Fruits and vegetables  · Make half of your plate fruits and vegetables.  · Eat many different colors of fruits and vegetables each day.  · For a 2,000 calorie daily food plan, eat:  ? 2½ cups of vegetables every day.  ? 2 cups of fruit every day.  · 1 cup is equal to:  ? 1 cup raw or cooked vegetables.  ? 1 cup raw fruit.  ? 1 medium-sized orange, apple, or banana.  ? 1 cup 100% fruit or vegetable juice.  ? 2 cups raw leafy greens, such as lettuce, spinach, or kale.  ? ½ cup dried fruit.  Grains  · One fourth of your plate should be grains.  · Make at least half of the grains you eat each day whole grains.  · For a 2,000 calorie daily food plan, eat 6 oz of grains every day.  · 1 oz is equal to:  ? 1 slice bread.  ? 1 cup cereal.  ? ½ cup cooked rice, cereal, or pasta.  Protein  · One fourth of your plate should be protein.  · Eat a wide variety of protein foods, including meat, poultry, fish, eggs, beans, nuts, and tofu.  · For a 2,000 calorie daily food plan, eat 5½ oz of protein every day.  · 1 oz is equal to:  ? 1 oz meat, poultry, or fish.  ? ¼ cup cooked beans.  ? 1 egg.  ? ½ oz nuts  or seeds.  ? 1 Tbsp peanut butter.  Dairy  · Drink fat-free or low-fat (1%) milk.  · Eat or drink dairy as a side to meals.  · For a 2,000 calorie daily food plan, eat or drink 3 cups of dairy every day.  · 1 cup is equal to:  ? 1 cup milk, yogurt, cottage cheese, or soy milk (soy beverage).  ? 2 oz processed cheese.  ? 1½ oz natural cheese.  Fats, oils, salt, and sugars  · Only small amounts of oils are recommended.  · Avoid foods that are high in calories and low in nutritional value (empty calories), like foods high in fat or added sugars.  · Choose foods that are low in salt (sodium). Choose foods that have less than 140 milligrams (mg) of sodium per serving.  · Drink water instead of sugary drinks. Drink enough water each day to keep your urine pale yellow.  Where to find support  · Work with your health care provider or a nutrition specialist (dietitian) to develop a customized eating plan that is right for you.  · Download an anne (mobile application) to help you track your daily food intake.  Where to find more information  · Go to ChooseMyPlate.gov for more information.  Summary  · MyPlate is a general guideline for healthy eating from the USDA. It is based on the 2010 Dietary Guidelines for Americans.  · In general, fruits and vegetables should take up ½ of your plate, grains should take up ¼ of your plate, and protein should take up ¼ of your plate.  This information is not intended to replace advice given to you by your health care provider. Make sure you discuss any questions you have with your health care provider.  Document Revised: 05/21/2020 Document Reviewed: 03/19/2018  Elsevier Patient Education © 2021 [employer].  Calorie Counting for Weight Loss  Calories are units of energy. Your body needs a certain amount of calories from food to keep you going throughout the day. When you eat more calories than your body needs, your body stores the extra calories as fat. When you eat fewer calories than  your body needs, your body burns fat to get the energy it needs.  Calorie counting means keeping track of how many calories you eat and drink each day. Calorie counting can be helpful if you need to lose weight. If you make sure to eat fewer calories than your body needs, you should lose weight. Ask your health care provider what a healthy weight is for you.  For calorie counting to work, you will need to eat the right number of calories in a day in order to lose a healthy amount of weight per week. A dietitian can help you determine how many calories you need in a day and will give you suggestions on how to reach your calorie goal.  · A healthy amount of weight to lose per week is usually 1-2 lb (0.5-0.9 kg). This usually means that your daily calorie intake should be reduced by 500-750 calories.  · Eating 1,200 - 1,500 calories per day can help most women lose weight.  · Eating 1,500 - 1,800 calories per day can help most men lose weight.  What is my plan?  My goal is to have __________ calories per day.  If I have this many calories per day, I should lose around __________ pounds per week.  What do I need to know about calorie counting?  In order to meet your daily calorie goal, you will need to:  · Find out how many calories are in each food you would like to eat. Try to do this before you eat.  · Decide how much of the food you plan to eat.  · Write down what you ate and how many calories it had. Doing this is called keeping a food log.  To successfully lose weight, it is important to balance calorie counting with a healthy lifestyle that includes regular activity. Aim for 150 minutes of moderate exercise (such as walking) or 75 minutes of vigorous exercise (such as running) each week.  Where do I find calorie information?    The number of calories in a food can be found on a Nutrition Facts label. If a food does not have a Nutrition Facts label, try to look up the calories online or ask your dietitian for  help.  Remember that calories are listed per serving. If you choose to have more than one serving of a food, you will have to multiply the calories per serving by the amount of servings you plan to eat. For example, the label on a package of bread might say that a serving size is 1 slice and that there are 90 calories in a serving. If you eat 1 slice, you will have eaten 90 calories. If you eat 2 slices, you will have eaten 180 calories.  How do I keep a food log?  Immediately after each meal, record the following information in your food log:  · What you ate. Don't forget to include toppings, sauces, and other extras on the food.  · How much you ate. This can be measured in cups, ounces, or number of items.  · How many calories each food and drink had.  · The total number of calories in the meal.  Keep your food log near you, such as in a small notebook in your pocket, or use a mobile anne or website. Some programs will calculate calories for you and show you how many calories you have left for the day to meet your goal.  What are some calorie counting tips?    · Use your calories on foods and drinks that will fill you up and not leave you hungry:  ? Some examples of foods that fill you up are nuts and nut butters, vegetables, lean proteins, and high-fiber foods like whole grains. High-fiber foods are foods with more than 5 g fiber per serving.  ? Drinks such as sodas, specialty coffee drinks, alcohol, and juices have a lot of calories, yet do not fill you up.  · Eat nutritious foods and avoid empty calories. Empty calories are calories you get from foods or beverages that do not have many vitamins or protein, such as candy, sweets, and soda. It is better to have a nutritious high-calorie food (such as an avocado) than a food with few nutrients (such as a bag of chips).  · Know how many calories are in the foods you eat most often. This will help you calculate calorie counts faster.  · Pay attention to calories in  "drinks. Low-calorie drinks include water and unsweetened drinks.  · Pay attention to nutrition labels for \"low fat\" or \"fat free\" foods. These foods sometimes have the same amount of calories or more calories than the full fat versions. They also often have added sugar, starch, or salt, to make up for flavor that was removed with the fat.  · Find a way of tracking calories that works for you. Get creative. Try different apps or programs if writing down calories does not work for you.  What are some portion control tips?  · Know how many calories are in a serving. This will help you know how many servings of a certain food you can have.  · Use a measuring cup to measure serving sizes. You could also try weighing out portions on a kitchen scale. With time, you will be able to estimate serving sizes for some foods.  · Take some time to put servings of different foods on your favorite plates, bowls, and cups so you know what a serving looks like.  · Try not to eat straight from a bag or box. Doing this can lead to overeating. Put the amount you would like to eat in a cup or on a plate to make sure you are eating the right portion.  · Use smaller plates, glasses, and bowls to prevent overeating.  · Try not to multitask (for example, watch TV or use your computer) while eating. If it is time to eat, sit down at a table and enjoy your food. This will help you to know when you are full. It will also help you to be aware of what you are eating and how much you are eating.  What are tips for following this plan?  Reading food labels  · Check the calorie count compared to the serving size. The serving size may be smaller than what you are used to eating.  · Check the source of the calories. Make sure the food you are eating is high in vitamins and protein and low in saturated and trans fats.  Shopping  · Read nutrition labels while you shop. This will help you make healthy decisions before you decide to purchase your " "food.  · Make a grocery list and stick to it.  Cooking  · Try to cook your favorite foods in a healthier way. For example, try baking instead of frying.  · Use low-fat dairy products.  Meal planning  · Use more fruits and vegetables. Half of your plate should be fruits and vegetables.  · Include lean proteins like poultry and fish.  How do I count calories when eating out?  · Ask for smaller portion sizes.  · Consider sharing an entree and sides instead of getting your own entree.  · If you get your own entree, eat only half. Ask for a box at the beginning of your meal and put the rest of your entree in it so you are not tempted to eat it.  · If calories are listed on the menu, choose the lower calorie options.  · Choose dishes that include vegetables, fruits, whole grains, low-fat dairy products, and lean protein.  · Choose items that are boiled, broiled, grilled, or steamed. Stay away from items that are buttered, battered, fried, or served with cream sauce. Items labeled \"crispy\" are usually fried, unless stated otherwise.  · Choose water, low-fat milk, unsweetened iced tea, or other drinks without added sugar. If you want an alcoholic beverage, choose a lower calorie option such as a glass of wine or light beer.  · Ask for dressings, sauces, and syrups on the side. These are usually high in calories, so you should limit the amount you eat.  · If you want a salad, choose a garden salad and ask for grilled meats. Avoid extra toppings like voss, cheese, or fried items. Ask for the dressing on the side, or ask for olive oil and vinegar or lemon to use as dressing.  · Estimate how many servings of a food you are given. For example, a serving of cooked rice is ½ cup or about the size of half a baseball. Knowing serving sizes will help you be aware of how much food you are eating at restaurants. The list below tells you how big or small some common portion sizes are based on everyday objects:  ? 1 oz--4 stacked " dice.  ? 3 oz--1 deck of cards.  ? 1 tsp--1 die.  ? 1 Tbsp--½ a ping-pong ball.  ? 2 Tbsp--1 ping-pong ball.  ? ½ cup--½ baseball.  ? 1 cup--1 baseball.  Summary  · Calorie counting means keeping track of how many calories you eat and drink each day. If you eat fewer calories than your body needs, you should lose weight.  · A healthy amount of weight to lose per week is usually 1-2 lb (0.5-0.9 kg). This usually means reducing your daily calorie intake by 500-750 calories.  · The number of calories in a food can be found on a Nutrition Facts label. If a food does not have a Nutrition Facts label, try to look up the calories online or ask your dietitian for help.  · Use your calories on foods and drinks that will fill you up, and not on foods and drinks that will leave you hungry.  · Use smaller plates, glasses, and bowls to prevent overeating.  This information is not intended to replace advice given to you by your health care provider. Make sure you discuss any questions you have with your health care provider.  Document Revised: 09/06/2019 Document Reviewed: 11/17/2017  [employer]. Patient Education © 2020 Elsevier Inc.

## 2021-06-02 NOTE — PROGRESS NOTES
Follow Up Office Visit      Patient Name: Sharee Cheek  : 1970   MRN: [id number]     Chief Complaint:    Chief Complaint   Patient presents with   • Hypertension     med refill       History of Present Illness: Sharee Cheek is a 51 y.o. female who is here today to follow up with frozen shoulder, hypertension, and hypothyroidism.  Patient also has onychomycosis.  Patient blood pressure is controlled.  Patient's hypothyroidism is unknown at this time, we need to recheck the TSH.  Patient had a dose change back in March.  Patient denies side effects from her medications but reports that sometimes she is noncompliant -patient has requested to change the wording of the sentence from noncompliant.  She reports that she is not noncompliant, she just prefers not to take medications when there are other options.    Patient has onychomycosis on left and right foot.  Patient did not want to take Lamisil due to the side effects.  Patient is willing to take Diflucan.      Patient is going to physical therapy for frozen shoulder.  This is her second time doing physical therapy.  Patient is followed by orthopedics.  Patient's neck step is to get surgery.    Annual exam: Discussed screening labs, colorectal cancer screening and breast cancer screening.  We also discussed cervical cancer screening.  We discussed diet and exercise.  We discussed vaccine status.    Review of systems is positive for left shoulder pain and limited range of motion, toenail rash      Physical exam: Patient's mood and affect was appropriate.  Patient's heart and lung exam was normal.  Patient's mood and affect was appropriate.  Patient did have limited range of motion of left shoulder with shoulder flexion.      Subjective        I have reviewed and the following portions of the patient's history were updated as appropriate: past family history, past medical history, past social history, past surgical history and problem  "list.    Medications:     Current Outpatient Medications:   •  EPINEPHrine (EPIPEN) 0.3 MG/0.3ML solution auto-injector injection, , Disp: , Rfl:   •  lisinopril (PRINIVIL,ZESTRIL) 40 MG tablet, Take 1 tablet by mouth Daily. No further refills until follow-up visit., Disp: 15 tablet, Rfl: 0  •  Synthroid 100 MCG tablet, Take 1 tablet by mouth Daily., Disp: 30 tablet, Rfl: 11  •  fluconazole (Diflucan) 150 MG tablet, Take one tablet per week for 6 months, Disp: 4 tablet, Rfl: 5    Allergies:   Allergies   Allergen Reactions   • Ibuprofen Anaphylaxis   • Acetaminophen Other (See Comments)     Atypical sedation     • Keflex [Cephalexin]    • Penicillins        Objective     Physical Exam: Please see HPI for physical exam  Vital Signs:   Vitals:    06/02/21 0826   BP: 130/80   Pulse: 73   Resp: 16   Temp: 97.5 °F (36.4 °C)   TempSrc: Temporal   SpO2: 99%   Weight: 106 kg (234 lb 9.6 oz)   Height: 163.7 cm (64.46\")   PainSc: 0-No pain     Body mass index is 39.7 kg/m².          Assessment / Plan      Assessment/Plan:   Diagnoses and all orders for this visit:    1. Essential hypertension (Primary)    2. Acquired hypothyroidism    3. Encounter for screening mammogram for malignant neoplasm of breast  -     Mammo Screening Digital Tomosynthesis Bilateral With CAD; Future    4. Onychomycosis  -     fluconazole (Diflucan) 150 MG tablet; Take one tablet per week for 6 months  Dispense: 4 tablet; Refill: 5    5. Encounter for colorectal cancer screening  -     Ambulatory Referral For Screening Colonoscopy    6. Adhesive capsulitis of left shoulder    7. Annual physical exam  -     Comprehensive Metabolic Panel  -     TSH Rfx On Abnormal To Free T4  -     Hemoglobin A1c  -     Hepatitis C Antibody  -     HIV-1 / O / 2 Ag / Antibody 4th Generation  -     Lipid Panel    Other orders  -     Cancel: Ambulatory Referral For Screening Colonoscopy    We discussed treatment onychomycosis with Diflucan for 6 months.  This is 1 " pill/week.    We also discussed sending her to Dr. Evelyn Peck for her frozen shoulder.  Patient can call for this referral at any time.    We will recheck patient is TSH and kidney function today.  Will call patient with results.    Annual exam: Discuss screening labs as above.  We also discussed diet and exercise and provided patient with information on diet.  We discussed colorectal cancer screening, breast cancer screening and cervical cancer screening.  Patient will go to the gynecologist.  We also discussed shingles and Pneumovax vaccine.\      Will offer Pneumovax and shingles vaccine next visit.  Will check on Pap smear completion next visit.  If patient does not get colonoscopy or mammogram scheduled this year then we will defer until next year.    Follow Up:   Return in about 4 months (around 10/2/2021).    Cameron Agarwal DO  [facility] [place]       Please note that portions of this note may have been completed with a voice recognition program. Efforts were made to edit the dictations, but occasionally words are mistranscribed.

## 2021-06-08 DIAGNOSIS — I10 ESSENTIAL HYPERTENSION: ICD-10-CM

## 2021-06-09 RX ORDER — LISINOPRIL 40 MG/1
40 TABLET ORAL DAILY
Qty: 90 TABLET | Refills: 1 | Status: SHIPPED | OUTPATIENT
Start: 2021-06-09 | End: 2022-02-05 | Stop reason: SDUPTHER

## 2021-10-15 ENCOUNTER — OFFICE VISIT (OUTPATIENT)
Dept: FAMILY MEDICINE CLINIC | Facility: CLINIC | Age: 51
End: 2021-10-15

## 2021-10-15 VITALS
SYSTOLIC BLOOD PRESSURE: 128 MMHG | DIASTOLIC BLOOD PRESSURE: 88 MMHG | BODY MASS INDEX: 40.46 KG/M2 | HEIGHT: 64 IN | HEART RATE: 88 BPM | OXYGEN SATURATION: 98 % | WEIGHT: 237 LBS | RESPIRATION RATE: 18 BRPM | TEMPERATURE: 98.6 F

## 2021-10-15 DIAGNOSIS — M54.42 ACUTE LEFT-SIDED LOW BACK PAIN WITH LEFT-SIDED SCIATICA: Primary | ICD-10-CM

## 2021-10-15 PROCEDURE — 99213 OFFICE O/P EST LOW 20 MIN: CPT | Performed by: NURSE PRACTITIONER

## 2021-10-15 RX ORDER — PREDNISONE 10 MG/1
TABLET ORAL
Qty: 48 TABLET | Refills: 0 | Status: SHIPPED | OUTPATIENT
Start: 2021-10-15 | End: 2021-12-27

## 2021-10-15 RX ORDER — BACLOFEN 10 MG/1
10 TABLET ORAL 3 TIMES DAILY
Qty: 90 TABLET | Refills: 0 | Status: SHIPPED | OUTPATIENT
Start: 2021-10-15 | End: 2021-12-27

## 2021-10-15 NOTE — PROGRESS NOTES
"Chief Complaint  Sciatica    Subjective          Sharee Cheek presents to [facility]  Back Pain  This is a new problem. The current episode started more than 1 month ago. The problem occurs intermittently. The problem has been gradually worsening since onset. The pain is present in the lumbar spine. The quality of the pain is described as shooting. The pain radiates to the left thigh. The pain is at a severity of 3/10. The pain is mild. The pain is worse during the day. The symptoms are aggravated by sitting. Stiffness is present all day. Pertinent negatives include no abdominal pain, bladder incontinence, bowel incontinence, chest pain, fever, numbness, paresthesias or pelvic pain. Treatments tried: tylenol. The treatment provided no relief.       Objective   Vital Signs:   /88   Pulse 88   Temp 98.6 °F (37 °C)   Resp 18   Ht 162.6 cm (64\")   Wt 108 kg (237 lb)   SpO2 98%   BMI 40.68 kg/m²     Physical Exam  Vitals and nursing note reviewed.   Constitutional:       General: She is not in acute distress.     Appearance: Normal appearance.   HENT:      Head: Normocephalic.      Right Ear: External ear normal.      Left Ear: External ear normal.      Nose: Nose normal.   Eyes:      Extraocular Movements: Extraocular movements intact.      Conjunctiva/sclera: Conjunctivae normal.      Pupils: Pupils are equal, round, and reactive to light.   Cardiovascular:      Rate and Rhythm: Normal rate and regular rhythm.      Heart sounds: Normal heart sounds.   Pulmonary:      Effort: Pulmonary effort is normal.      Breath sounds: Normal breath sounds.   Musculoskeletal:         General: Tenderness present. No swelling.      Lumbar back: Tenderness present. No swelling, edema or signs of trauma. Negative right straight leg raise test and negative left straight leg raise test.      Right lower leg: No edema.      Left lower leg: No edema.      Comments: Left lower back " tenderness   Skin:     General: Skin is warm and dry.      Findings: No erythema or rash.   Neurological:      Mental Status: She is alert and oriented to person, place, and time.      Gait: Gait normal.   Psychiatric:         Mood and Affect: Mood normal.         Behavior: Behavior normal.         Thought Content: Thought content normal.         Judgment: Judgment normal.        Result Review :     Common labs    Common Labsle 6/2/21 6/2/21 6/2/21    0905 [id number] 0907   Glucose 118 (A)     BUN 10     Creatinine 0.78     eGFR Non African Am 78     Sodium 139     Potassium 4.7     Chloride 103     Calcium 9.7     Albumin 4.20     Total Bilirubin 0.3     Alkaline Phosphatase 96     AST (SGOT) 15     ALT (SGPT) 12     Total Cholesterol   211 (A)   Triglycerides   88   HDL Cholesterol   67 (A)   LDL Cholesterol    128 (A)   Hemoglobin A1C  5.95 (A)    (A) Abnormal value                     Assessment and Plan    Diagnoses and all orders for this visit:    1. Acute left-sided low back pain with left-sided sciatica (Primary)  Assessment & Plan:  X-ray lumbar spine ordered  Steroid dose pack as directed  Muscle relaxer started, Baclofen PRN to take as directed for back pain/spasms  Heat/Ice therapy  Suggested chiropractor evaluation  Go to ER if severe pain    Orders:  -     predniSONE (DELTASONE) 10 MG (48) dose pack; Take as directed  Dispense: 48 tablet; Refill: 0  -     XR Spine Lumbar 4+ View; Future  -     baclofen (LIORESAL) 10 MG tablet; Take 1 tablet by mouth 3 (Three) Times a Day.  Dispense: 90 tablet; Refill: 0    I spent 18 minutes caring for Sharee on this date of service. This time includes time spent by me in the following activities:preparing for the visit, obtaining and/or reviewing a separately obtained history, performing a medically appropriate examination and/or evaluation , counseling and educating the patient/family/caregiver, ordering medications, tests, or procedures, documenting information in the  medical record and independently interpreting results and communicating that information with the patient/family/caregiver  Follow Up   Return if symptoms worsen or fail to improve.  Patient was given instructions and counseling regarding her condition or for health maintenance advice. Please see specific information pulled into the AVS if appropriate.

## 2021-10-21 PROBLEM — M54.42 ACUTE LEFT-SIDED LOW BACK PAIN WITH LEFT-SIDED SCIATICA: Status: ACTIVE | Noted: 2021-10-21

## 2021-10-21 NOTE — ASSESSMENT & PLAN NOTE
X-ray lumbar spine ordered  Steroid dose pack as directed  Muscle relaxer started, Baclofen PRN to take as directed for back pain/spasms  Heat/Ice therapy  Suggested chiropractor evaluation  Go to ER if severe pain

## 2021-10-22 DIAGNOSIS — Z12.11 SCREENING FOR COLON CANCER: Primary | ICD-10-CM

## 2021-10-26 ENCOUNTER — HOSPITAL ENCOUNTER (OUTPATIENT)
Dept: GENERAL RADIOLOGY | Facility: HOSPITAL | Age: 51
Discharge: HOME | End: 2021-10-26
Admitting: NURSE PRACTITIONER

## 2021-10-26 DIAGNOSIS — M54.42 ACUTE LEFT-SIDED LOW BACK PAIN WITH LEFT-SIDED SCIATICA: ICD-10-CM

## 2021-10-26 PROCEDURE — 72110 X-RAY EXAM L-2 SPINE 4/>VWS: CPT

## 2021-12-03 ENCOUNTER — TELEPHONE (OUTPATIENT)
Dept: ENDOCRINOLOGY | Facility: CLINIC | Age: 51
End: 2021-12-03

## 2021-12-03 NOTE — TELEPHONE ENCOUNTER
PATIENT STATES THAT HER PHARMACY HAS TOLD HER THAT HER PRIOR AUTH FOR SYNTHROID HAS . THEY ALLOWED HER TO PURCHASE 5 TABS BUT WILL NEED TO GET PRIOR AUTH AS SOON AS POSSIBLE.

## 2021-12-06 NOTE — TELEPHONE ENCOUNTER
Returned pt call advised that I  Attempted her PA, however when I submit via [facility] it states unable to locate patient.  I asked if her Insurance had changed she stated she had not.  I will continue to work on this

## 2021-12-07 ENCOUNTER — TELEPHONE (OUTPATIENT)
Dept: ENDOCRINOLOGY | Facility: CLINIC | Age: 51
End: 2021-12-07

## 2021-12-07 DIAGNOSIS — Z12.11 SCREENING FOR COLON CANCER: ICD-10-CM

## 2021-12-07 NOTE — TELEPHONE ENCOUNTER
Sharee Cheek Key: [id number] - Rx #: [id number]  No Additional Information Required  Drug is covered by current benefit plan. No further PA activity needed  Drug  Synthroid 100MCG tablets  Form  [employer] Electronic PA Form (2017 NCPDP)    ? Information regarding your request  Drug is covered by current benefit plan. No further PA activity needed    Faxed this Info to Johana

## 2021-12-07 NOTE — TELEPHONE ENCOUNTER
Returned pt call. Advised her that for the past 4 months especially the pharmacy will send a denial, I will submit the PA, it will stated Does not require PA.  They told her this time the PA had . I submitted it again, again stated Covered.  I spoke with pharmacy they will contact Insurance to see what is going on omn their end.

## 2021-12-07 NOTE — TELEPHONE ENCOUNTER
PT CALLED STATING HER SYNTHROID RX WAS DENIED WHEN [facility] TRIED TO FILL RX. SHE REQUESTED WE REACH OUT TO PHARM IN REGARDS TO PA. PLEASE AND THANK YOU    PT IS ALMOST OUT OF MEDS

## 2021-12-08 RX ORDER — SODIUM PICOSULFATE, MAGNESIUM OXIDE, AND ANHYDROUS CITRIC ACID 10; 3.5; 12 MG/160ML; G/160ML; G/160ML
LIQUID ORAL
OUTPATIENT
Start: 2021-12-08

## 2021-12-27 ENCOUNTER — OFFICE VISIT (OUTPATIENT)
Dept: FAMILY MEDICINE CLINIC | Facility: CLINIC | Age: 51
End: 2021-12-27

## 2021-12-27 VITALS
TEMPERATURE: 97 F | HEIGHT: 64 IN | OXYGEN SATURATION: 98 % | WEIGHT: 240.2 LBS | RESPIRATION RATE: 20 BRPM | BODY MASS INDEX: 41.01 KG/M2 | SYSTOLIC BLOOD PRESSURE: 130 MMHG | DIASTOLIC BLOOD PRESSURE: 92 MMHG | HEART RATE: 88 BPM

## 2021-12-27 DIAGNOSIS — B35.1 ONYCHOMYCOSIS: Primary | ICD-10-CM

## 2021-12-27 DIAGNOSIS — F41.9 ANXIETY: ICD-10-CM

## 2021-12-27 DIAGNOSIS — L98.9 SKIN LESION OF LEFT ARM: ICD-10-CM

## 2021-12-27 PROCEDURE — 99214 OFFICE O/P EST MOD 30 MIN: CPT | Performed by: NURSE PRACTITIONER

## 2021-12-27 RX ORDER — BUSPIRONE HYDROCHLORIDE 5 MG/1
5 TABLET ORAL 2 TIMES DAILY
Qty: 60 TABLET | Refills: 1 | Status: SHIPPED | OUTPATIENT
Start: 2021-12-27 | End: 2022-05-16

## 2021-12-27 NOTE — ASSESSMENT & PLAN NOTE
Patient states that she has had anxiety issues for years, but symptoms have been worsening recently.  Pharmacotherapy per orders.  Discussed referral to behavioral health services, patient declined at this time but will consider.  Follow-up in 4 weeks.

## 2021-12-27 NOTE — PROGRESS NOTES
Follow Up Office Note     Patient Name: Sharee Cheek  : 1970   MRN: [id number]     Chief Complaint:    Chief Complaint   Patient presents with   • Nail Problem   • Anxiety       History of Present Illness: Sharee Cheek is a 51 y.o. female who presents today for reevaluation and management of chronic toenail fungus bilateral great toes.  Patient recently was prescribed a course of fluconazole for treatment of her onychomycosis, however she states it made her symptoms worse.  Patient wants to discuss trying Lamisil and/or other treatment options.  Patient also presents today with a new complaint of anxiety and panic episodes. She states she has had anxiety issues for years, but up until now her symptoms have always been manageable.  She states that after the death of a family member 2 years ago her symptoms have been progressively worsening.  Lastly, she would like to discuss a skin lesion she has on her left forearm that she is concerned may be precancerous.      Subjective      Review of Systems:   Review of Systems   Constitutional: Negative for activity change, appetite change, chills, diaphoresis, fatigue and fever.   Respiratory: Negative.    Cardiovascular: Negative.    Gastrointestinal: Negative.    Skin:        Toenail fungus bilateral great toes.  Skin lesion left forearm.   Neurological: Negative.    Psychiatric/Behavioral: Negative for dysphoric mood and suicidal ideas. The patient is nervous/anxious.         Past Medical History:   Past Medical History:   Diagnosis Date   • History of conjunctivitis    • History of influenza    • History of sinusitis    • Hypertension    • Insomnia          Medications:     Current Outpatient Medications:   •  lisinopril (PRINIVIL,ZESTRIL) 40 MG tablet, Take 1 tablet by mouth Daily. No further refills until follow-up visit., Disp: 90 tablet, Rfl: 1  •  Synthroid 100 MCG tablet, Take 1 tablet by mouth Daily., Disp: 30 tablet, Rfl: 11  •  busPIRone  "(BUSPAR) 5 MG tablet, Take 1 tablet by mouth 2 (Two) Times a Day., Disp: 60 tablet, Rfl: 1  •  EPINEPHrine (EPIPEN) 0.3 MG/0.3ML solution auto-injector injection, , Disp: , Rfl:   •  fluconazole (Diflucan) 150 MG tablet, Take one tablet per week for 6 months, Disp: 4 tablet, Rfl: 5    Allergies:   Allergies   Allergen Reactions   • Ibuprofen Anaphylaxis   • Acetaminophen Other (See Comments)     Atypical sedation     • Keflex [Cephalexin]    • Penicillins      DOMENICA-7  Feeling nervous, anxious or on edge: More than half the days  Not being able to stop or control worrying: Several days  Worrying too much about different things: More than half the days  Trouble Relaxing: Several days  Being so restless that it is hard to sit still: Not at all  Feeling afraid as if something awful might happen: More than half the days  Becoming easily annoyed or irritable: Not at all  DOMENICA 7 Total Score: 8  If you checked any problems, how difficult have these problems made it for you to do your work, take care of things at home, or get along with other people: Somewhat difficult    PHQ-2/PHQ-9 Depression Screening 6/2/2021   Little interest or pleasure in doing things 0   Feeling down, depressed, or hopeless 0   Total Score 0         Objective     Physical Exam:  Vital Signs:   Vitals:    12/27/21 0845   BP: 130/92   Pulse: 88   Resp: 20   Temp: 97 °F (36.1 °C)   SpO2: 98%   Weight: 109 kg (240 lb 3.2 oz)   Height: 162.6 cm (64\")   PainSc: 0-No pain     Body mass index is 41.23 kg/m².     Physical Exam  Vitals and nursing note reviewed.   Constitutional:       General: She is not in acute distress.     Appearance: Normal appearance. She is well-developed. She is not ill-appearing, toxic-appearing or diaphoretic.   HENT:      Head: Normocephalic and atraumatic.   Pulmonary:      Effort: Pulmonary effort is normal. No respiratory distress.      Breath sounds: No stridor. No wheezing.   Feet:      Right foot:      Toenail Condition: Fungal " disease present.     Left foot:      Toenail Condition: Fungal disease present.     Comments: Fungal disease bilateral great toes.  Skin:     General: Skin is warm and dry.      Findings: Lesion present.             Comments: Rough hyperpigmented scaly patch on left forearm that is asymmetrical and approximately 3 to 4 mm in size.   Neurological:      General: No focal deficit present.      Mental Status: She is alert and oriented to person, place, and time.   Psychiatric:         Mood and Affect: Mood normal.         Behavior: Behavior normal. Behavior is cooperative.         Thought Content: Thought content normal.         Judgment: Judgment normal.         Assessment / Plan      Assessment/Plan:   Diagnoses and all orders for this visit:    1. Anxiety (Primary)  Assessment & Plan:  Patient states that she has had anxiety issues for years, but symptoms have been worsening recently.  Pharmacotherapy per orders.  Discussed referral to behavioral health services, patient declined at this time but will consider.  Follow-up in 4 weeks.    Orders:  -     busPIRone (BUSPAR) 5 MG tablet; Take 1 tablet by mouth 2 (Two) Times a Day.  Dispense: 60 tablet; Refill: 1    2. Onychomycosis  Assessment & Plan:  Toenail fungus worsening. Discussed Lamisil with patient as well as monitoring of liver functions. Patient decided that she does not want to try the Lamisil out of concern for side effects. Patient states that she does not like taking medication.  Referral to podiatry per orders.    Orders:  -     Ambulatory Referral to Podiatry    3. Skin lesion of left arm  Assessment & Plan:  Scaly skin lesion on left forearm times several months. Recommend referral to dermatology for further evaluation. Patient verbalized understanding and agreement with plan of care. She states she will schedule her own appointment.       Follow Up:   4 weeks    Discussed the nature of the medical condition(s) risks, complications, implications,  management, safe and proper use of medications. Encouraged medication compliance, and keeping scheduled follow up appointments with me and any other providers.      RTC if symptoms fail to improve, to ER if symptoms worsen.      RIK Kuhn  [facility] [place]

## 2021-12-27 NOTE — PATIENT INSTRUCTIONS
Managing Anxiety, Adult  After being diagnosed with an anxiety disorder, you may be relieved to know why you have felt or behaved a certain way. You may also feel overwhelmed about the treatment ahead and what it will mean for your life. With care and support, you can manage this condition and recover from it.  How to manage lifestyle changes  Managing stress and anxiety    Stress is your body's reaction to life changes and events, both good and bad. Most stress will last just a few hours, but stress can be ongoing and can lead to more than just stress. Although stress can play a major role in anxiety, it is not the same as anxiety. Stress is usually caused by something external, such as a deadline, test, or competition. Stress normally passes after the triggering event has ended.   Anxiety is caused by something internal, such as imagining a terrible outcome or worrying that something will go wrong that will devastate you. Anxiety often does not go away even after the triggering event is over, and it can become long-term (chronic) worry. It is important to understand the differences between stress and anxiety and to manage your stress effectively so that it does not lead to an anxious response.  Talk with your health care provider or a counselor to learn more about reducing anxiety and stress. He or she may suggest tension reduction techniques, such as:  · Music therapy. This can include creating or listening to music that you enjoy and that inspires you.  · Mindfulness-based meditation. This involves being aware of your normal breaths while not trying to control your breathing. It can be done while sitting or walking.  · Centering prayer. This involves focusing on a word, phrase, or sacred image that means something to you and brings you peace.  · Deep breathing. To do this, expand your stomach and inhale slowly through your nose. Hold your breath for 3-5 seconds. Then exhale slowly, letting your stomach muscles  relax.  · Self-talk. This involves identifying thought patterns that lead to anxiety reactions and changing those patterns.  · Muscle relaxation. This involves tensing muscles and then relaxing them.  Choose a tension reduction technique that suits your lifestyle and personality. These techniques take time and practice. Set aside 5-15 minutes a day to do them. Therapists can offer counseling and training in these techniques. The training to help with anxiety may be covered by some insurance plans. Other things you can do to manage stress and anxiety include:  · Keeping a stress/anxiety diary. This can help you learn what triggers your reaction and then learn ways to manage your response.  · Thinking about how you react to certain situations. You may not be able to control everything, but you can control your response.  · Making time for activities that help you relax and not feeling guilty about spending your time in this way.  · Visual imagery and yoga can help you stay calm and relax.    Medicines  Medicines can help ease symptoms. Medicines for anxiety include:  · Anti-anxiety drugs.  · Antidepressants.  Medicines are often used as a primary treatment for anxiety disorder. Medicines will be prescribed by a health care provider. When used together, medicines, psychotherapy, and tension reduction techniques may be the most effective treatment.  Relationships  Relationships can play a big part in helping you recover. Try to spend more time connecting with trusted friends and family members. Consider going to couples counseling, taking family education classes, or going to family therapy. Therapy can help you and others better understand your condition.  How to recognize changes in your anxiety  Everyone responds differently to treatment for anxiety. Recovery from anxiety happens when symptoms decrease and stop interfering with your daily activities at home or work. This may mean that you will start to:  · Have  better concentration and focus. Worry will interfere less in your daily thinking.  · Sleep better.  · Be less irritable.  · Have more energy.  · Have improved memory.  It is important to recognize when your condition is getting worse. Contact your health care provider if your symptoms interfere with home or work and you feel like your condition is not improving.  Follow these instructions at home:  Activity  · Exercise. Most adults should do the following:  ? Exercise for at least 150 minutes each week. The exercise should increase your heart rate and make you sweat (moderate-intensity exercise).  ? Strengthening exercises at least twice a week.  · Get the right amount and quality of sleep. Most adults need 7-9 hours of sleep each night.  Lifestyle    · Eat a healthy diet that includes plenty of vegetables, fruits, whole grains, low-fat dairy products, and lean protein. Do not eat a lot of foods that are high in solid fats, added sugars, or salt.  · Make choices that simplify your life.  · Do not use any products that contain nicotine or tobacco, such as cigarettes, e-cigarettes, and chewing tobacco. If you need help quitting, ask your health care provider.  · Avoid caffeine, alcohol, and certain over-the-counter cold medicines. These may make you feel worse. Ask your pharmacist which medicines to avoid.    General instructions  · Take over-the-counter and prescription medicines only as told by your health care provider.  · Keep all follow-up visits as told by your health care provider. This is important.  Where to find support  You can get help and support from these sources:  · Self-help groups.  · Online and community organizations.  · A trusted spiritual leader.  · Couples counseling.  · Family education classes.  · Family therapy.  Where to find more information  You may find that joining a support group helps you deal with your anxiety. The following sources can help you locate counselors or support groups  near you:  · Mental Health [place]: www.mentalhealth[place].net  · Anxiety and Depression Association of [place] (ADAA): www.adaa.org  · National [place] [employer] Mental Illness (LION): www.lion.org  Contact a health care provider if you:  · Have a hard time staying focused or finishing daily tasks.  · Spend many hours a day feeling worried about everyday life.  · Become exhausted by worry.  · Start to have headaches, feel tense, or have nausea.  · Urinate more than normal.  · Have diarrhea.  Get help right away if you have:  · A racing heart and shortness of breath.  · Thoughts of hurting yourself or others.  If you ever feel like you may hurt yourself or others, or have thoughts about taking your own life, get help right away. You can go to your nearest emergency department or call:  · Your local emergency services (911 in the [place]  · A suicide crisis helpline, such as the National Suicide Prevention Lifeline at 1-983.215.8065. This is open 24 hours a day.  Summary  · Taking steps to learn and use tension reduction techniques can help calm you and help prevent triggering an anxiety reaction.  · When used together, medicines, psychotherapy, and tension reduction techniques may be the most effective treatment.  · Family, friends, and partners can play a big part in helping you recover from an anxiety disorder.  This information is not intended to replace advice given to you by your health care provider. Make sure you discuss any questions you have with your health care provider.  Document Revised: 05/19/2020 Document Reviewed: 05/19/2020  [employer] Patient Education © 2021 [employer]    Fungal Nail Infection  A fungal nail infection is a common infection of the toenails or fingernails. This condition affects toenails more often than fingernails. It often affects the great, or big, toes. More than one nail may be infected. The condition can be passed from person to person (is contagious).  What are the causes?  This  condition is caused by a fungus. Several types of fungi can cause the infection. These fungi are common in moist and warm areas. If your hands or feet come into contact with the fungus, it may get into a crack in your fingernail or toenail and cause the infection.  What increases the risk?  The following factors may make you more likely to develop this condition:  · Being male.  · Being of older age.  · Living with someone who has the fungus.  · Walking barefoot in areas where the fungus thrives, such as showers or locker rooms.  · Wearing shoes and socks that cause your feet to sweat.  · Having a nail injury or a recent nail surgery.  · Having certain medical conditions, such as:  ? Athlete's foot.  ? Diabetes.  ? Psoriasis.  ? Poor circulation.  ? A weak body defense system (immune system).  What are the signs or symptoms?  Symptoms of this condition include:  · A pale spot on the nail.  · Thickening of the nail.  · A nail that becomes yellow or brown.  · A brittle or ragged nail edge.  · A crumbling nail.  · A nail that has lifted away from the nail bed.  How is this diagnosed?  This condition is diagnosed with a physical exam. Your health care provider may take a scraping or clipping from your nail to test for the fungus.  How is this treated?  Treatment is not needed for mild infections. If you have significant nail changes, treatment may include:  · Antifungal medicines taken by mouth (orally). You may need to take the medicine for several weeks or several months, and you may not see the results for a long time. These medicines can cause side effects. Ask your health care provider what problems to watch for.  · Antifungal nail polish or nail cream. These may be used along with oral antifungal medicines.  · Laser treatment of the nail.  · Surgery to remove the nail. This may be needed for the most severe infections.  It can take a long time, usually up to a year, for the infection to go away. The infection may  also come back.  Follow these instructions at home:  Medicines  · Take or apply over-the-counter and prescription medicines only as told by your health care provider.  · Ask your health care provider about using over-the-counter mentholated ointment on your nails.  Nail care  · Trim your nails often.  · Wash and dry your hands and feet every day.  · Keep your feet dry:  ? Wear absorbent socks, and change your socks frequently.  ? Wear shoes that allow air to circulate, such as sandals or canvas tennis shoes. Throw out old shoes.  · Do not use artificial nails.  · If you go to a nail salon, make sure you choose one that uses clean instruments.  · Use antifungal foot powder on your feet and in your shoes.  General instructions  · Do not share personal items, such as towels or nail clippers.  · Do not walk barefoot in shower rooms or locker rooms.  · Wear rubber gloves if you are working with your hands in wet areas.  · Keep all follow-up visits as told by your health care provider. This is important.  Contact a health care provider if:  Your infection is not getting better or it is getting worse after several months.  Summary  · A fungal nail infection is a common infection of the toenails or fingernails.  · Treatment is not needed for mild infections. If you have significant nail changes, treatment may include taking medicine orally and applying medicine to your nails.  · It can take a long time, usually up to a year, for the infection to go away. The infection may also come back.  · Take or apply over-the-counter and prescription medicines only as told by your health care provider.  · Follow instructions for taking care of your nails to help prevent infection from coming back or spreading.  This information is not intended to replace advice given to you by your health care provider. Make sure you discuss any questions you have with your health care provider.  Document Revised: 04/09/2020 Document Reviewed:  05/24/2019  [email or web address] Patient Education © 2021 [email or web address]  Buspirone tablets  What is this medicine?  BUSPIRONE (bysteven yip) is used to treat anxiety disorders.  This medicine may be used for other purposes; ask your health care provider or pharmacist if you have questions.  COMMON BRAND NAME(S): Huey  What should I tell my health care provider before I take this medicine?  They need to know if you have any of these conditions:  · kidney or liver disease  · an unusual or allergic reaction to buspirone, other medicines, foods, dyes, or preservatives  · pregnant or trying to get pregnant  · breast-feeding  How should I use this medicine?  Take this medicine by mouth with a glass of water. Follow the directions on the prescription label. You may take this medicine with or without food. To ensure that this medicine always works the same way for you, you should take it either always with or always without food. Take your doses at regular intervals. Do not take your medicine more often than directed. Do not stop taking except on the advice of your doctor or health care professional.  Talk to your pediatrician regarding the use of this medicine in children. Special care may be needed.  Overdosage: If you think you have taken too much of this medicine contact a poison control center or emergency room at once.  NOTE: This medicine is only for you. Do not share this medicine with others.  What if I miss a dose?  If you miss a dose, take it as soon as you can. If it is almost time for your next dose, take only that dose. Do not take double or extra doses.  What may interact with this medicine?  Do not take this medicine with any of the following medications:  · linezolid  · MAOIs like Carbex, Eldepryl, Marplan, Nardil, and Parnate  · methylene blue  · procarbazine  This medicine may also interact with the following medications:  · diazepam  · digoxin  · diltiazem  · erythromycin  · grapefruit  juice  · haloperidol  · medicines for mental depression or mood problems  · medicines for seizures like carbamazepine, phenobarbital and phenytoin  · nefazodone  · other medications for anxiety  · rifampin  · ritonavir  · some antifungal medicines like itraconazole, ketoconazole, and voriconazole  · verapamil  · warfarin  This list may not describe all possible interactions. Give your health care provider a list of all the medicines, herbs, non-prescription drugs, or dietary supplements you use. Also tell them if you smoke, drink alcohol, or use illegal drugs. Some items may interact with your medicine.  What should I watch for while using this medicine?  Visit your doctor or health care professional for regular checks on your progress. It may take 1 to 2 weeks before your anxiety gets better.  You may get drowsy or dizzy. Do not drive, use machinery, or do anything that needs mental alertness until you know how this drug affects you. Do not stand or sit up quickly, especially if you are an older patient. This reduces the risk of dizzy or fainting spells. Alcohol can make you more drowsy and dizzy. Avoid alcoholic drinks.  What side effects may I notice from receiving this medicine?  Side effects that you should report to your doctor or health care professional as soon as possible:  · blurred vision or other vision changes  · chest pain  · confusion  · difficulty breathing  · feelings of hostility or anger  · muscle aches and pains  · numbness or tingling in hands or feet  · ringing in the ears  · skin rash and itching  · vomiting  · weakness  Side effects that usually do not require medical attention (report to your doctor or health care professional if they continue or are bothersome):  · disturbed dreams, nightmares  · headache  · nausea  · restlessness or nervousness  · sore throat and nasal congestion  · stomach upset  This list may not describe all possible side effects. Call your doctor for medical advice  about side effects. You may report side effects to FDA at 0-468-IHX-2828.  Where should I keep my medicine?  Keep out of the reach of children.  Store at room temperature below 30 degrees C (86 degrees F). Protect from light. Keep container tightly closed. Throw away any unused medicine after the expiration date.  NOTE: This sheet is a summary. It may not cover all possible information. If you have questions about this medicine, talk to your doctor, pharmacist, or health care provider.  © 2021 [employer] (2011-07-28 18:06:11)  Terbinafine tablets  What is this medicine?  TERBINAFINE (TER bin a feen) is an antifungal medicine. It is used to treat certain kinds of fungal or yeast infections.  This medicine may be used for other purposes; ask your health care provider or pharmacist if you have questions.  COMMON BRAND NAME(S): Lamisil, Terbinex  What should I tell my health care provider before I take this medicine?  They need to know if you have any of these conditions:  · drink alcoholic beverages  · kidney disease  · liver disease  · an unusual or allergic reaction to terbinafine, other medicines, foods, dyes, or preservatives  · pregnant or trying to get pregnant  · breast-feeding  How should I use this medicine?  Take this medicine by mouth with a full glass of water. Follow the directions on the prescription label. You can take this medicine with food or on an empty stomach. Take your medicine at regular intervals. Do not take your medicine more often than directed. Do not skip doses or stop your medicine early even if you feel better. Do not stop taking except on your doctor's advice.  A special MedGuide will be given to you by the pharmacist with each prescription and refill. Be sure to read this information carefully each time.  Talk to your pediatrician regarding the use of this medicine in children. Special care may be needed.  Overdosage: If you think you have taken too much of this medicine  contact a poison control center or emergency room at once.  NOTE: This medicine is only for you. Do not share this medicine with others.  What if I miss a dose?  If you miss a dose, take it as soon as you can. If it is almost time for your next dose, take only that dose. Do not take double or extra doses.  What may interact with this medicine?  Do not take this medicine with any of the following medications:  · thioridazine  This medicine may also interact with the following medications:  · beta-blockers  · caffeine  · cimetidine  · cyclosporine  · medicines for depression, anxiety, or psychotic disturbances  · medicines for fungal infections like fluconazole and ketoconazole  · medicines for irregular heartbeat like amiodarone, flecainide and propafenone  · rifampin  · warfarin  This list may not describe all possible interactions. Give your health care provider a list of all the medicines, herbs, non-prescription drugs, or dietary supplements you use. Also tell them if you smoke, drink alcohol, or use illegal drugs. Some items may interact with your medicine.  What should I watch for while using this medicine?  Visit your doctor or health care provider regularly. Tell your doctor right away if you have nausea or vomiting, loss of appetite, stomach pain on your right upper side, yellow skin, dark urine, light stools, or are over tired. Some fungal infections need many weeks or months of treatment to cure. If you are taking this medicine for a long time, you will need to have important blood work done.  This medicine may cause serious skin reactions. They can happen weeks to months after starting the medicine. Contact your health care provider right away if you notice fevers or flu-like symptoms with a rash. The rash may be red or purple and then turn into blisters or peeling of the skin. Or, you might notice a red rash with swelling of the face, lips or lymph nodes in your neck or under your arms.  What side  effects may I notice from receiving this medicine?  Side effects that you should report to your doctor or health care professional as soon as possible:  · allergic reactions like skin rash or hives, swelling of the face, lips, or tongue  · changes in vision  · dark urine  · fever or infection  · general ill feeling or flu-like symptoms  · light-colored stools  · loss of appetite, nausea  · rash, fever, and swollen lymph nodes  · redness, blistering, peeling or loosening of the skin, including inside the mouth  · right upper belly pain  · unusually weak or tired  · yellowing of the eyes or skin  Side effects that usually do not require medical attention (report to your doctor or health care professional if they continue or are bothersome):  · changes in taste  · diarrhea  · hair loss  · muscle or joint pain  · stomach gas  · stomach upset  This list may not describe all possible side effects. Call your doctor for medical advice about side effects. You may report side effects to FDA at 0-237-XHC-2392.  Where should I keep my medicine?  Keep out of the reach of children.  Store at room temperature below 25 degrees C (77 degrees F). Protect from light. Throw away any unused medicine after the expiration date.  NOTE: This sheet is a summary. It may not cover all possible information. If you have questions about this medicine, talk to your doctor, pharmacist, or health care provider.  © 2021 [employer]/Gold [employer] (2020-03-27 15:37:07)    Preventing Skin Cancer, Adult  Skin cancer is the most common type of cancer. There are three main types. Squamous cell and basal cell skin cancer are the most common. Melanoma skin cancer is the most dangerous type.  Most skin cancers are caused by skin damage from exposure to ultraviolet (UV) light. UV light comes from the sun and from artificial tanning beds. Suntans and sunburns result from exposure to UV light.  Skin cancer occurs most often in older people, but it is usually the  result of damage done earlier in life. The tans and sunburns you get at any age can lead to skin cancer in the future. To help prevent this, you can take steps to protect yourself.  What actions can I take to protect myself from skin cancer?  Many people like to get a tan, especially in the summer or when on vacation. However, tan or burned skin is a sign of skin damage. It increases your risk for skin cancer. To lower your risk:  Avoid exposure to UV light    · Try to stay out of the sun between 10 a.m. and 4 p.m. whenever possible. This is when the sun is at its strongest. Seek the shade during this time.  · Remember that you can also be exposed to UV rays on cloudy or hazy days. Sun exposure can be risky year-round, not just in the summer.  · Do not use a sunlamp, tanning bed, or tanning barbosa to get a tan. If you really want a tan, use an artificial tanning lotion.  · Avoid getting sunburned. Sunburns are more common on bright yvrose days, especially when you are in areas where the sun is reflected off water or snow.    Use sunscreen and protective clothing    · Always use sunscreen--either a cream, lotion, or spray--when you are out in the sun. Keep sunscreen handy, such as in your gym bag or in your car, so that you will have it when you need it.  · Use a sunscreen with a sun protection factor (SPF) of at least 15. Use an SPF of 30 or higher if you are in bright sun, especially when you are out in the snow or on the water.  · Make sure your sunscreen protects you from UVA and UVB light.  · Use an adequate amount of sunscreen to cover exposed areas of skin. Put it on 30 minutes before you go out. Reapply it every 2 hours or anytime you come out of the water.  · When you are out in the sun, wear a broad-brimmed hat and clothing that covers your arms and legs. Wear wraparound sunglasses.    Check your skin for changes  · Check your skin often from head to toe to look for any changes in the size, color, or shape of  any moles or freckles. Check for any new moles or moles that bleed or become itchy. See your health care provider if you notice changes.  · Ask your health care provider about a total skin check. Ask if it should be part of your yearly physical or if you need to see a skin specialist (dermatologist).  Take other preventive measures    · Avoid exposure to harmful chemicals, such as arsenic.  ? Have your home's water tested for arsenic and other chemicals.  ? Take protective measures to avoid exposure to chemicals at work.  · Do not smoke any tobacco products, such as cigarettes, cigars, pipes, and e-cigarettes. If you need help quitting, ask your health care provider.  · Keep your immune system healthy.  ? Stay up to date on all vaccines, including the human papillomavirus (HPV) vaccine.  ? Eat at least 5 servings of fruits and vegetables every day.    Why are these changes important?  About 1 of every 5 people will get skin cancer. The best way to reduce your risk is to avoid skin damage from UV light. If you have teenagers in your house, they should know that just five bad sunburns as a teen could double their risk of skin cancer in the future. If you have younger children, always make sure to protect their skin from the sun.  These changes can help reduce your risk of skin cancer, and they will also provide other health benefits, such as the following:  · Protecting your skin from the sun can help prevent painful sunburns, sun poisoning, and other skin damage and blemishes. This is especially important if:  ? You have pale white skin, freckles, and red hair.  ? You burn easily.  · Avoiding exposure to harmful chemicals can help prevent damage to other tissues in your body, such as your lungs, and prevent other types of cancer.  · Avoiding smoking tobacco can reduce your risk for other types of cancer and other health problems.  · Eating a healthy diet is good for your overall health.  What can happen if changes  are not made?  If you do not make these changes, you will be at higher risk for skin cancer. If you develop skin cancer, the treatments could result in lost time from work and changes in your appearance from scars. The most dangerous type of skin cancer, melanoma, can be deadly if not found early.  Where to find support  For more support, talk to your primary health care provider or dermatologist.  Where to find more information  Learn more about skin cancer from:  · The Skin Cancer Foundation: www.skincancer.org/prevention  · The Centers for Disease Control and Prevention: www.cdc.gov/cancer/skin/  · The American Academy of Dermatology: www.aad.org  Summary  · Skin cancer is the most common type of cancer.  · Melanoma skin cancer can be deadly if not found early.  · Sunburns and tanning increase your risk for skin cancer.  · Protecting your skin from UV light is the best way to prevent skin cancer.  This information is not intended to replace advice given to you by your health care provider. Make sure you discuss any questions you have with your health care provider.  Document Revised: 04/10/2020 Document Reviewed: 2019  [employer] Patient Education ©  Elsevier Inc.  Skin Cancer Prevention  Describe how skin cancer is prevented along with the importance of screening for skin cancer.  To view the content, go to this web address:  [email or web address]    This video will  on: 2023. If you need access to this video following this date, please reach out to the healthcare provider who assigned it to you.  This information is not intended to replace advice given to you by your health care provider. Make sure you discuss any questions you have with your health care provider.  [employer]’s editorial and clinical teams regularly review and update content to ensure it is up-to-date with changing practice standards and recognized medical guidelines.  Document Revised: 2021  [employer] Patient  Education © 2021 Elsevier Inc.

## 2021-12-27 NOTE — ASSESSMENT & PLAN NOTE
Toenail fungus worsening. Discussed Lamisil with patient as well as monitoring of liver functions. Patient decided that she does not want to try the Lamisil out of concern for side effects. Patient states that she does not like taking medication.  Referral to podiatry per orders.

## 2021-12-27 NOTE — ASSESSMENT & PLAN NOTE
Scaly skin lesion on left forearm times several months. Recommend referral to dermatology for further evaluation. Patient verbalized understanding and agreement with plan of care. She states she will schedule her own appointment.

## 2022-01-09 ENCOUNTER — TELEMEDICINE (OUTPATIENT)
Dept: FAMILY MEDICINE CLINIC | Facility: TELEHEALTH | Age: 52
End: 2022-01-09

## 2022-01-09 DIAGNOSIS — J32.9 SINUSITIS, UNSPECIFIED CHRONICITY, UNSPECIFIED LOCATION: Primary | ICD-10-CM

## 2022-01-09 PROCEDURE — U0003 INFECTIOUS AGENT DETECTION BY NUCLEIC ACID (DNA OR RNA); SEVERE ACUTE RESPIRATORY SYNDROME CORONAVIRUS 2 (SARS-COV-2) (CORONAVIRUS DISEASE [COVID-19]), AMPLIFIED PROBE TECHNIQUE, MAKING USE OF HIGH THROUGHPUT TECHNOLOGIES AS DESCRIBED BY CMS-2020-01-R: HCPCS | Performed by: NURSE PRACTITIONER

## 2022-01-09 PROCEDURE — 99213 OFFICE O/P EST LOW 20 MIN: CPT | Performed by: NURSE PRACTITIONER

## 2022-01-09 RX ORDER — AZITHROMYCIN 500 MG/1
500 TABLET, FILM COATED ORAL DAILY
Qty: 3 TABLET | Refills: 0 | Status: SHIPPED | OUTPATIENT
Start: 2022-01-09 | End: 2022-01-12

## 2022-01-09 NOTE — PROGRESS NOTES
Mode of Visit: Video  Location of patient: home  You have chosen to receive care through a telehealth visit.  The patient has signed the video visit consent form.  The visit included audio and video interaction. No technical issues occurred during this visit.     Chief Complaint  Sinusitis    Subjective          Sharee Cheek presents to [facility]  Sinusitis  This is a new problem. The current episode started in the past 7 days. The problem has been gradually worsening since onset. There has been no fever. Associated symptoms include congestion, coughing, headaches, a hoarse voice and a sore throat. Pertinent negatives include no ear pain (ear congestion), shortness of breath, sinus pressure or swollen glands.     Objective   Vital Signs:   There were no vitals taken for this visit.    Physical Exam   Constitutional: She appears well-developed and well-nourished. No distress.   Pulmonary/Chest: Effort normal.   Neurological: She is alert.   Psychiatric: She has a normal mood and affect.     Result Review :                 Assessment and Plan    Diagnoses and all orders for this visit:    1. Sinusitis, unspecified chronicity, unspecified location (Primary)  -     azithromycin (ZITHROMAX) 500 MG tablet; Take 1 tablet by mouth Daily for 3 days.  Dispense: 3 tablet; Refill: 0  -     COVID-19,[facility] ROUTINE, NP/OP SWAB IN TRANSPORT MEDIA OR ESWAB 72 HR TAT - Swab, Anterior nasal; Future        Take antibiotic until gone. May take probiotic with antibiotic if prone to antibiotic induced diarrhea. Flonase is recommended for daily use by most ENTs if you have Allergic or Reactive sinus problems. It will help with nasal congestion, but takes several days to become fully effective and is not a fast acting medication. It is also recommended to start and continue Claritin, Zyrtec or Allegra daily to control postnasal drainage, if you are prone to reactive sinus issues due to weather or  seasonal changes. Cool mist humidifier at bedside will help secretions remain thin and more easily drain, relieving the pressure in your sinuses. Follow up with PCP, Urgent Care or Video Visit if symptoms have not resolved in 7-10 days. If symptoms worse, go to Urgent Care or follow up with PCP. If you develop high fever, chest pain, shortness of breath or any life-threatening symptoms, go to nearest Emergency Department.        I spent 20 minutes caring for Sharee on this date of service. This time includes time spent by me in the following activities:preparing for the visit, obtaining and/or reviewing a separately obtained history, performing a medically appropriate examination and/or evaluation , counseling and educating the patient/family/caregiver, ordering medications, tests, or procedures, and documenting information in the medical record  Follow Up   No follow-ups on file.  Patient was given instructions and counseling regarding her condition or for health maintenance advice. Please see specific information pulled into the AVS if appropriate.

## 2022-01-10 DIAGNOSIS — Z12.11 ENCOUNTER FOR SCREENING COLONOSCOPY: Primary | ICD-10-CM

## 2022-01-31 ENCOUNTER — OUTSIDE FACILITY SERVICE (OUTPATIENT)
Dept: GASTROENTEROLOGY | Facility: CLINIC | Age: 52
End: 2022-01-31

## 2022-01-31 PROCEDURE — 88305 TISSUE EXAM BY PATHOLOGIST: CPT | Performed by: INTERNAL MEDICINE

## 2022-01-31 PROCEDURE — 45385 COLONOSCOPY W/LESION REMOVAL: CPT | Performed by: INTERNAL MEDICINE

## 2022-01-31 PROCEDURE — 45380 COLONOSCOPY AND BIOPSY: CPT | Performed by: INTERNAL MEDICINE

## 2022-02-01 ENCOUNTER — LAB REQUISITION (OUTPATIENT)
Dept: LAB | Facility: HOSPITAL | Age: 52
End: 2022-02-01

## 2022-02-01 DIAGNOSIS — D12.5 BENIGN NEOPLASM OF SIGMOID COLON: ICD-10-CM

## 2022-02-01 DIAGNOSIS — Z12.11 ENCOUNTER FOR SCREENING FOR MALIGNANT NEOPLASM OF COLON: ICD-10-CM

## 2022-02-01 DIAGNOSIS — K64.8 OTHER HEMORRHOIDS: ICD-10-CM

## 2022-02-01 DIAGNOSIS — D12.8 BENIGN NEOPLASM OF RECTUM: ICD-10-CM

## 2022-02-02 LAB
CYTO UR: NORMAL
LAB AP CASE REPORT: NORMAL
LAB AP CLINICAL INFORMATION: NORMAL
PATH REPORT.FINAL DX SPEC: NORMAL
PATH REPORT.GROSS SPEC: NORMAL

## 2022-02-05 DIAGNOSIS — I10 ESSENTIAL HYPERTENSION: ICD-10-CM

## 2022-02-07 RX ORDER — LISINOPRIL 40 MG/1
40 TABLET ORAL DAILY
Qty: 90 TABLET | Refills: 0 | Status: SHIPPED | OUTPATIENT
Start: 2022-02-07 | End: 2022-06-01 | Stop reason: SDUPTHER

## 2022-03-31 ENCOUNTER — TELEPHONE (OUTPATIENT)
Dept: ENDOCRINOLOGY | Facility: CLINIC | Age: 52
End: 2022-03-31

## 2022-03-31 RX ORDER — LEVOTHYROXINE SODIUM 100 MCG
100 TABLET ORAL DAILY
Qty: 30 TABLET | Refills: 0 | OUTPATIENT
Start: 2022-03-31

## 2022-04-06 RX ORDER — LEVOTHYROXINE SODIUM 100 MCG
100 TABLET ORAL DAILY
Qty: 30 TABLET | Refills: 5 | Status: SHIPPED | OUTPATIENT
Start: 2022-04-06 | End: 2022-11-10

## 2022-05-16 ENCOUNTER — TELEPHONE (OUTPATIENT)
Dept: FAMILY MEDICINE CLINIC | Facility: CLINIC | Age: 52
End: 2022-05-16

## 2022-05-16 ENCOUNTER — TELEMEDICINE (OUTPATIENT)
Dept: FAMILY MEDICINE CLINIC | Facility: CLINIC | Age: 52
End: 2022-05-16

## 2022-05-16 DIAGNOSIS — R52 BODY ACHES: ICD-10-CM

## 2022-05-16 DIAGNOSIS — R05.9 COUGH: ICD-10-CM

## 2022-05-16 DIAGNOSIS — J02.9 SORE THROAT: Primary | ICD-10-CM

## 2022-05-16 PROCEDURE — 87880 STREP A ASSAY W/OPTIC: CPT | Performed by: NURSE PRACTITIONER

## 2022-05-16 PROCEDURE — 87804 INFLUENZA ASSAY W/OPTIC: CPT | Performed by: NURSE PRACTITIONER

## 2022-05-16 PROCEDURE — 99213 OFFICE O/P EST LOW 20 MIN: CPT | Performed by: NURSE PRACTITIONER

## 2022-05-16 RX ORDER — EFINACONAZOLE 100 MG/ML
SOLUTION TOPICAL
COMMUNITY
Start: 2022-02-16 | End: 2023-03-18

## 2022-05-16 NOTE — PROGRESS NOTES
Follow Up Office Note     Patient Name: Sharee Cheek  : 1970   MRN: [id number]     Chief Complaint:    Chief Complaint   Patient presents with   • Sore Throat   • Generalized Body Aches   • Nasal Congestion   • Exposure To Known Illness      tested positive for COVID-19       History of Present Illness: Sharee Cheek is a 52 y.o. female. Patient presents today via Epic MyChart audio/video telehealth visit. Patient consented to receive care via telehealth services. Patient location at time of telehealth visit was her residence in [facility] and provider location was [facility].  Patient presents with complaint of sore throat, nasal congestion, ears hurting, postnasal drainage and cough x3 days.  Patient denies fever or chills, but does endorse mild body aches.  Patient states her  tested positive for COVID-19 on 22.  Patient states that she has had 3 negative COVID-19 test at home this weekend.  She states she went to  today for PCR test-results are pending.  Patient is requesting testing today for flu and strep.      Subjective      Review of Systems:   Review of Systems   Constitutional: Positive for fatigue. Negative for activity change, appetite change, chills, diaphoresis and fever.   HENT: Positive for congestion, ear pain, postnasal drip and sore throat. Negative for ear discharge, sinus pain, trouble swallowing and voice change.    Respiratory: Positive for cough. Negative for chest tightness, shortness of breath, wheezing and stridor.    Cardiovascular: Negative.    Gastrointestinal: Negative.    Musculoskeletal: Positive for myalgias.   Neurological: Negative.         Past Medical History:   Past Medical History:   Diagnosis Date   • History of conjunctivitis    • History of influenza    • History of sinusitis    • Hypertension    • Insomnia          Medications:     Current Outpatient Medications:   •  busPIRone  (BUSPAR) 5 MG tablet, Take 1 tablet by mouth 2 (Two) Times a Day., Disp: 60 tablet, Rfl: 1  •  lisinopril (PRINIVIL,ZESTRIL) 40 MG tablet, Take 1 tablet by mouth Daily. No further refills until follow-up visit., Disp: 90 tablet, Rfl: 0  •  Sod Picosulfate-Mag Ox-Cit Acd 10-3.5-12 MG-GM -GM/160ML solution, Take 160 mL by mouth Take As Directed for 2 doses., Disp: 320 mL, Rfl: 0  •  Synthroid 100 MCG tablet, Take 1 tablet by mouth Daily., Disp: 30 tablet, Rfl: 5    Allergies:   Allergies   Allergen Reactions   • Ibuprofen Anaphylaxis   • Acetaminophen Other (See Comments)     Atypical sedation     • Keflex [Cephalexin]    • Penicillins          Objective     Physical Exam:  Vital Signs: There were no vitals filed for this visit.  There is no height or weight on file to calculate BMI.     Physical Exam  Constitutional:       General: She is not in acute distress.     Appearance: She is well-developed and well-groomed. She is not toxic-appearing or diaphoretic.   HENT:      Head: Normocephalic and atraumatic.   Pulmonary:      Effort: Pulmonary effort is normal. No respiratory distress.      Breath sounds: No stridor. No wheezing.   Neurological:      Mental Status: She is alert and oriented to person, place, and time.   Psychiatric:         Mood and Affect: Mood normal.         Behavior: Behavior normal. Behavior is cooperative.         Assessment / Plan      Assessment/Plan:   Diagnoses and all orders for this visit:    1. Sore throat (Primary)  -     POC Rapid Strep A    2. Body aches  -     POC Rapid Strep A  -     POC Influenza A / B  -     QUESTIONNAIRE SERIES    3. Cough  -     POC Influenza A / B  -     QUESTIONNAIRE SERIES      Lab Results   Component Value Date    RAPSCRN Negative 05/16/2022     Lab Results   Component Value Date    RAPFLUA Negative 05/16/2022    RAPFLUB Negative 05/16/2022     Quarantine at home per CDC guidelines pending COVID-19 testing results.  Continue current medications.  Recommend  symptomatic management, rest and maintaining adequate hydration.    Follow Up:   PRN and at next scheduled appointment(s) with PCP.    Discussed the nature of the medical condition(s) risks, complications, implications, management, safe and proper use of medications. Encouraged medication compliance, and keeping scheduled follow up appointments with me and any other providers.      This was an audio and video enabled telemedicine encounter.  I spent 15 minutes caring for Sharee on this date of service. This time includes time spent by me in the following activities:preparing for the visit, reviewing tests, performing a medically appropriate examination and/or evaluation , counseling and educating the patient/family/caregiver, ordering medications, tests, or procedures and documenting information in the medical record.    RTC if symptoms fail to improve, to ER if symptoms worsen.      RIK Kuhn  [facility] [place]

## 2022-06-01 DIAGNOSIS — I10 ESSENTIAL HYPERTENSION: ICD-10-CM

## 2022-06-01 RX ORDER — LISINOPRIL 40 MG/1
40 TABLET ORAL DAILY
Qty: 90 TABLET | Refills: 0 | Status: SHIPPED | OUTPATIENT
Start: 2022-06-01 | End: 2022-09-16 | Stop reason: SDUPTHER

## 2022-06-13 ENCOUNTER — TELEPHONE (OUTPATIENT)
Dept: ENDOCRINOLOGY | Facility: CLINIC | Age: 52
End: 2022-06-13

## 2022-06-13 NOTE — TELEPHONE ENCOUNTER
Pt. Reports last week she noticed a very sore area to the right side of her neck where they have told her that she had a thyroid nodule. She states she can feel a lump there but painful to touch. 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 Appt. Scheduled on Wednesday with Dr. Kramer for eval. With possible US. BEA

## 2022-06-13 NOTE — TELEPHONE ENCOUNTER
Patient called stated she is having pain in her neck and thinks it is because of her thyroid. Please advise.

## 2022-06-15 ENCOUNTER — OFFICE VISIT (OUTPATIENT)
Dept: ENDOCRINOLOGY | Facility: CLINIC | Age: 52
End: 2022-06-15

## 2022-06-15 ENCOUNTER — LAB (OUTPATIENT)
Dept: LAB | Facility: HOSPITAL | Age: 52
End: 2022-06-15

## 2022-06-15 VITALS
BODY MASS INDEX: 39.09 KG/M2 | WEIGHT: 229 LBS | OXYGEN SATURATION: 97 % | HEIGHT: 64 IN | HEART RATE: 88 BPM | SYSTOLIC BLOOD PRESSURE: 132 MMHG | DIASTOLIC BLOOD PRESSURE: 80 MMHG

## 2022-06-15 DIAGNOSIS — E04.1 THYROID NODULE: Primary | ICD-10-CM

## 2022-06-15 DIAGNOSIS — E03.8 HYPOTHYROIDISM DUE TO HASHIMOTO'S THYROIDITIS: ICD-10-CM

## 2022-06-15 DIAGNOSIS — E06.3 HYPOTHYROIDISM DUE TO HASHIMOTO'S THYROIDITIS: ICD-10-CM

## 2022-06-15 LAB
T4 FREE SERPL-MCNC: 1.52 NG/DL (ref 0.93–1.7)
TSH SERPL DL<=0.05 MIU/L-ACNC: 0.93 UIU/ML (ref 0.27–4.2)

## 2022-06-15 PROCEDURE — 84443 ASSAY THYROID STIM HORMONE: CPT | Performed by: INTERNAL MEDICINE

## 2022-06-15 PROCEDURE — 76536 US EXAM OF HEAD AND NECK: CPT | Performed by: INTERNAL MEDICINE

## 2022-06-15 PROCEDURE — 84439 ASSAY OF FREE THYROXINE: CPT | Performed by: INTERNAL MEDICINE

## 2022-06-15 PROCEDURE — 99213 OFFICE O/P EST LOW 20 MIN: CPT | Performed by: INTERNAL MEDICINE

## 2022-07-11 NOTE — PROGRESS NOTES
"Chief Complaint   Patient presents with   • Thyroid Nodule      Follow up        HPI:   Sharee Cheek is a 52 y.o.female who [facility] for f/u evaluation of her thyroid nodule and hypothyroidism. Last clinic visit 03/02/2021 with Dr. Clarke.  Her history is as follows:    Interim Events:  - Patient had COVID in May 2022.   - Patient reported that she always has a feeling of fullness in the anterior neck.  However in the past few weeks, she has noticed more tenderness in the area of her thyroid gland.  Denies fever, cough, or congestion at this time.    1) hypothyroidism due to Hashimoto's thyroiditis:  - Diagnosed in her early 20s    Current dose: Brand Synthroid 100 mcg daily  - Takes in AM on an empty stomach  - Denies missed doses  - Is not on high-dose biotin supplement    2) Right Thyroid nodule:  - has been followed with serial thyroid US by Dr. Clarke since 2018.     FMH: pt's 1st cousin had a h/o thyroid cancer  PMH: no h/o irradiation of head, neck, or chest    Review of Systems   Constitutional: Negative.  Negative for fever.        Wt loss, mild   HENT: Negative.  Negative for congestion.    Eyes: Negative.    Respiratory: Negative.  Negative for cough.    Cardiovascular: Negative.    Gastrointestinal: Negative.    Endocrine: Negative.    Genitourinary: Negative.    Musculoskeletal: Positive for neck pain (anterior).   Skin: Negative.    Allergic/Immunologic: Negative.    Neurological: Negative.    Hematological: Negative.    Psychiatric/Behavioral: Negative.        The following portions of the patient's history were reviewed and updated as appropriate: allergies, current medications, past family history, past medical history, past social history, past surgical history and problem list.    /80   Pulse 88   Ht 162.6 cm (64\")   Wt 104 kg (229 lb)   SpO2 97%   BMI 39.31 kg/m²   Physical Exam  Vitals reviewed.   Constitutional:       General: She is not in acute distress.     " Appearance: She is well-developed. She is not diaphoretic.   HENT:      Head: Normocephalic.   Eyes:      Conjunctiva/sclera: Conjunctivae normal.      Pupils: Pupils are equal, round, and reactive to light.   Neck:      Thyroid: No thyromegaly.      Trachea: No tracheal deviation.      Comments: No palpable thyroid nodules    Cardiovascular:      Rate and Rhythm: Normal rate and regular rhythm.      Heart sounds: Normal heart sounds. No murmur heard.  Pulmonary:      Effort: Pulmonary effort is normal. No respiratory distress.      Breath sounds: Normal breath sounds.   Musculoskeletal:      Cervical back: Tenderness (mild upon deep palpation) present.   Lymphadenopathy:      Cervical: No cervical adenopathy.   Skin:     General: Skin is warm and dry.      Findings: No erythema.   Neurological:      Mental Status: She is alert and oriented to person, place, and time.      Cranial Nerves: No cranial nerve deficit.   Psychiatric:         Behavior: Behavior normal.         LABS/IMAGING:   Results for orders placed or performed in visit on 06/15/22   TSH    Specimen: Blood   Result Value Ref Range    TSH 0.933 0.270 - 4.200 uIU/mL   T4, Free    Specimen: Blood   Result Value Ref Range    Free T4 1.52 0.93 - 1.70 ng/dL       PROCEDURES (in office):  Thyroid Ultrasound Report  [facility]  [place], [place]    Date: 06/15/2022  Indication: thyroid nodule  Comparison Imaging: [facility] Thyroid US by Dr. Clarke (05/2018), (12/2019), (03/2021)   Clinical History: 52 y.o. female with long h/o hypothyroidism due to Hashimoto's thyroiditis and thyroid nodule on prior imaging.     Real time high resolution imaging of the thyroid gland was performed in transverse and longitudinal planes.  The right lobe measured 4.73 cm in Length x 1.97 cm in AP diameter x 2.28 cm in TV dimension.  The isthmus measured 0.20 cm in thickness.  The left thyroid lobe measured 3.97 cm in length x 1.40 cm in AP diameter x 1.79 cm  in TV dimension.    The thyroid gland appeared overall hypoechoic with diffusely heterogeneous echotexture and fibrous stranding throughout the gland.  These ultrasound findings are consistent with the patient's history of hypothyroidism due to Hashimoto's thyroiditis.    In the right mid lobe, a 1.29(L) x 0.77(AP) x 1.24(T) cm isoechoic solid nodule was again identified.  The nodule had a smooth margin, minimal peripheral vascularity, and no microcalcifications.  The nodule appeared smaller in comparison to prior imaging.    No discrete nodules were identified in the left lobe or isthmus.    No pathologic lymph nodes were seen.     IMPRESSION:   1) The thyroid gland was normal in size by measured dimensions.   2) The thyroid gland appeared overall hypoechoic with diffusely heterogeneous echotexture and fibrous stranding throughout the gland. These ultrasound findings are consistent with the patient's history of hypothyroidism due to Hashimoto's thyroiditis.  3) No discrete nodules were identified in the left lobe or isthmus.  4) In the right mid lobe, a 1.29(L) x 0.77(AP) x 1.24(T) cm isoechoic solid nodule was again identified.  The nodule had a smooth margin, minimal peripheral vascularity, and no microcalcifications.  The nodule appeared smaller in comparison to prior imaging.    RECOMMENDATIONS: Repeat Thyroid US recommended if changes in the gland / neck are noted on physical exam or if persistent cervical lymphadenopathy develops.     Signed: Jen Kramer MD      ASSESSMENT/PLAN:  1) right thyroid nodule: Thyroid ultrasound completed in clinic today showed -  - The thyroid gland was normal in size by measured dimensions.   - The thyroid gland appeared overall hypoechoic with diffusely heterogeneous echotexture and fibrous stranding throughout the gland. These ultrasound findings are consistent with the patient's history of hypothyroidism due to Hashimoto's thyroiditis.  - No discrete nodules were identified  in the left lobe or isthmus.  - In the right mid lobe, a 1.29(L) x 0.77(AP) x 1.24(T) cm isoechoic solid nodule was again identified.  The nodule had a smooth margin, minimal peripheral vascularity, and no microcalcifications.  The nodule appeared smaller in comparison to prior imaging.  RECOMMENDATIONS: Repeat Thyroid US recommended if changes in the gland / neck are noted on physical exam or if persistent cervical lymphadenopathy develops.     2) hypothyroidism due to Hashimoto's thyroiditis:  - Clinically euthyroid on exam.  - TFTs checked today were within normal limits  - Continue brand Synthroid 100 mcg daily  -Discussed that the anterior neck pain may be a mild thyroiditis related to her recent COVID infection.  Patient cannot take ibuprofen due to severe allergy.  Advised patient she may take Tylenol nightly to see if this improves her neck pain.     Patient is scheduled to return to clinic on September 6, 2022 with Dr. Clarke    Signed: Jen Kramer MD

## 2022-07-11 NOTE — PROGRESS NOTES
Thyroid Ultrasound Report  [facility]  [place], [place]    Date: 06/15/2022  Indication: thyroid nodule  Comparison Imaging: [facility] Clinic Thyroid US by Dr. Clarke (05/2018), (12/2019), (03/2021)   Clinical History: 52 y.o. female with long h/o hypothyroidism due to Hashimoto's thyroiditis and thyroid nodule on prior imaging.     Real time high resolution imaging of the thyroid gland was performed in transverse and longitudinal planes.  The right lobe measured 4.73 cm in Length x 1.97 cm in AP diameter x 2.28 cm in TV dimension.  The isthmus measured 0.20 cm in thickness.  The left thyroid lobe measured 3.97 cm in length x 1.40 cm in AP diameter x 1.79 cm in TV dimension.    The thyroid gland appeared overall hypoechoic with diffusely heterogeneous echotexture and fibrous stranding throughout the gland.  These ultrasound findings are consistent with the patient's history of hypothyroidism due to Hashimoto's thyroiditis.    In the right mid lobe, a 1.29(L) x 0.77(AP) x 1.24(T) cm isoechoic solid nodule was again identified.  The nodule had a smooth margin, minimal peripheral vascularity, and no microcalcifications.  The nodule appeared smaller in comparison to prior imaging.    No discrete nodules were identified in the left lobe or isthmus.    No pathologic lymph nodes were seen.     IMPRESSION:   1) The thyroid gland was normal in size by measured dimensions.   2) The thyroid gland appeared overall hypoechoic with diffusely heterogeneous echotexture and fibrous stranding throughout the gland. These ultrasound findings are consistent with the patient's history of hypothyroidism due to Hashimoto's thyroiditis.  3) No discrete nodules were identified in the left lobe or isthmus.  4) In the right mid lobe, a 1.29(L) x 0.77(AP) x 1.24(T) cm isoechoic solid nodule was again identified.  The nodule had a smooth margin, minimal peripheral vascularity, and no microcalcifications.  The nodule appeared  smaller in comparison to prior imaging.    RECOMMENDATIONS: Repeat Thyroid US recommended if changes in the gland / neck are noted on physical exam or if persistent cervical lymphadenopathy develops.     Signed: Jen Kramer MD

## 2022-09-16 ENCOUNTER — TELEMEDICINE (OUTPATIENT)
Dept: FAMILY MEDICINE CLINIC | Facility: CLINIC | Age: 52
End: 2022-09-16

## 2022-09-16 DIAGNOSIS — Z76.0 MEDICATION REFILL: ICD-10-CM

## 2022-09-16 DIAGNOSIS — I10 PRIMARY HYPERTENSION: Primary | Chronic | ICD-10-CM

## 2022-09-16 DIAGNOSIS — F34.1 DYSTHYMIA: ICD-10-CM

## 2022-09-16 DIAGNOSIS — F41.9 ANXIETY: ICD-10-CM

## 2022-09-16 DIAGNOSIS — F51.04 PSYCHOPHYSIOLOGICAL INSOMNIA: ICD-10-CM

## 2022-09-16 PROCEDURE — 99214 OFFICE O/P EST MOD 30 MIN: CPT | Performed by: NURSE PRACTITIONER

## 2022-09-16 RX ORDER — BUSPIRONE HYDROCHLORIDE 10 MG/1
10 TABLET ORAL 2 TIMES DAILY
Qty: 30 TABLET | Refills: 1 | Status: SHIPPED | OUTPATIENT
Start: 2022-09-16 | End: 2023-01-26 | Stop reason: SDUPTHER

## 2022-09-16 RX ORDER — TRAZODONE HYDROCHLORIDE 50 MG/1
50 TABLET ORAL NIGHTLY
Qty: 30 TABLET | Refills: 1 | Status: SHIPPED | OUTPATIENT
Start: 2022-09-16 | End: 2022-12-15

## 2022-09-16 RX ORDER — LISINOPRIL 40 MG/1
40 TABLET ORAL DAILY
Qty: 30 TABLET | Refills: 3 | Status: SHIPPED | OUTPATIENT
Start: 2022-09-16 | End: 2023-01-30 | Stop reason: SDUPTHER

## 2022-09-16 NOTE — ASSESSMENT & PLAN NOTE
Newly identified depression.  Medication therapy per orders.  Follow-up in 4 weeks.  Consider referral to behavioral health services.

## 2022-09-16 NOTE — PROGRESS NOTES
"     Follow Up Office Note     Patient Name: Sharee Cheek  : 1970   MRN: [id number]     Chief Complaint:    Chief Complaint   Patient presents with   • Anxiety   • Depression   • Hypertension   • Med Refill       History of Present Illness: Sharee Cheek is a 52 y.o. female. Patient presents today via Epic MyChart audio/video telehealth visit. Patient consented to receive care via telehealth services. Patient location at time of telehealth visit was her workplace in [facility] and provider location was [facility] at [facility].    Patient presents for reevaluation and management of chronic hypertension.  Patient states that she has not been monitoring her blood pressure however she feels that her blood pressure is elevated.  She states she has been under a lot of stress recently and is having headaches.  She is requesting refills on her lisinopril today.    Patient also complaining of worsening of chronic anxiety.  Patient states she had an old BuSpar prescription at home which she restarted but has not experienced any improvement in her symptoms as of yet.  Patient states she has been taking 5 mg twice daily.     Patient complaining of new onset of depressive symptoms.  Patient states she has taken trazodone in the past for insomnia and felt that it did help her anxiety and depression.  She would like to discuss either trying this medication or another similar medication to help her symptoms.      Subjective      Review of Systems:   Review of Systems   Constitutional: Negative.    Respiratory: Negative.    Cardiovascular: Negative.    Neurological: Negative.    Psychiatric/Behavioral: Positive for dysphoric mood and sleep disturbance. The patient is nervous/anxious.         Patient rates her anxiety as a \"6-7\" and her depression as a \"7\" on a scale of 1-10 with 10 being the worst.        Past Medical History:   Past Medical History:   Diagnosis Date   • " History of conjunctivitis    • History of influenza    • History of sinusitis    • Hypertension    • Insomnia          Medications:     Current Outpatient Medications:   •  lisinopril (PRINIVIL,ZESTRIL) 40 MG tablet, Take 1 tablet by mouth Daily. No further refills until follow-up visit., Disp: 30 tablet, Rfl: 3  •  busPIRone (BUSPAR) 10 MG tablet, Take 1 tablet by mouth 2 (Two) Times a Day., Disp: 30 tablet, Rfl: 1  •  Jublia 10 % solution, , Disp: , Rfl:   •  Synthroid 100 MCG tablet, Take 1 tablet by mouth Daily., Disp: 30 tablet, Rfl: 5  •  traZODone (DESYREL) 50 MG tablet, Take 1 tablet by mouth Every Night., Disp: 30 tablet, Rfl: 1    Allergies:   Allergies   Allergen Reactions   • Ibuprofen Anaphylaxis   • Acetaminophen Other (See Comments)     Atypical sedation     • Keflex [Cephalexin]    • Penicillins          Objective     Physical Exam:  Vital Signs: There were no vitals filed for this visit.  There is no height or weight on file to calculate BMI.     Physical Exam  Constitutional:       General: She is not in acute distress.     Appearance: Normal appearance. She is well-developed and well-groomed. She is not ill-appearing, toxic-appearing or diaphoretic.   HENT:      Head: Normocephalic and atraumatic.   Pulmonary:      Effort: Pulmonary effort is normal. No respiratory distress.      Breath sounds: No stridor. No wheezing.   Neurological:      Mental Status: She is alert and oriented to person, place, and time.   Psychiatric:         Attention and Perception: Attention and perception normal.         Mood and Affect: Mood normal. Affect is tearful (at times).         Speech: Speech normal.         Behavior: Behavior is cooperative.         Thought Content: Thought content normal.         Cognition and Memory: Cognition and memory normal.         Judgment: Judgment normal.         Assessment / Plan      Assessment/Plan:   Diagnoses and all orders for this visit:    1. Primary hypertension  (Primary)  Assessment & Plan:  Patient's hypertension has been stable but above goal of less than 130/80.  Continue current treatment regimen.  Dietary sodium restriction.  Weight loss.  Regular aerobic exercise.  Continue current medications.  Ambulatory blood pressure monitoring.  Blood pressure will be reassessed in 4 weeks.    Orders:  -     lisinopril (PRINIVIL,ZESTRIL) 40 MG tablet; Take 1 tablet by mouth Daily. No further refills until follow-up visit.  Dispense: 30 tablet; Refill: 3    2. Anxiety  Assessment & Plan:  Worsening of chronic anxiety.  Patient only has 5 pills left of buspirone.  Will increase dosage of buspirone from 5 mg twice daily to 10 mg twice daily.    Orders:  -     traZODone (DESYREL) 50 MG tablet; Take 1 tablet by mouth Every Night.  Dispense: 30 tablet; Refill: 1  -     busPIRone (BUSPAR) 10 MG tablet; Take 1 tablet by mouth 2 (Two) Times a Day.  Dispense: 30 tablet; Refill: 1    3. Dysthymia  Assessment & Plan:  Newly identified depression.  Medication therapy per orders.  Follow-up in 4 weeks.  Consider referral to behavioral health services.      4. Psychophysiological insomnia  -     traZODone (DESYREL) 50 MG tablet; Take 1 tablet by mouth Every Night.  Dispense: 30 tablet; Refill: 1    5. Medication refill  -     lisinopril (PRINIVIL,ZESTRIL) 40 MG tablet; Take 1 tablet by mouth Daily. No further refills until follow-up visit.  Dispense: 30 tablet; Refill: 3     Follow Up:   4 weeks    Discussed the nature of the medical condition(s) risks, complications, implications, management, safe and proper use of medications. Encouraged medication compliance, and keeping scheduled follow up appointments with me and any other providers.      This was an audio and video enabled telemedicine encounter.  I spent 20 minutes caring for Sharee on this date of service. This time includes time spent by me in the following activities:preparing for the visit, reviewing tests, performing a medically  appropriate examination and/or evaluation , counseling and educating the patient/family/caregiver, ordering medications, tests, or procedures and documenting information in the medical record.      RTC if symptoms fail to improve, to ER if symptoms worsen.        *Dictated Utilizing Dragon Dictation   Please note that portions of this note were completed with a voice recognition program.   Part of this note may be an electronic transcription/translation of spoken language to printed text using the Dragon Dictation System.          RIK Kuhn  [facility] [place] [facility] [place]

## 2022-09-16 NOTE — ASSESSMENT & PLAN NOTE
Patient's hypertension has been stable but above goal of less than 130/80.  Continue current treatment regimen.  Dietary sodium restriction.  Weight loss.  Regular aerobic exercise.  Continue current medications.  Ambulatory blood pressure monitoring.  Blood pressure will be reassessed in 4 weeks.

## 2022-09-16 NOTE — ASSESSMENT & PLAN NOTE
Worsening of chronic anxiety.  Patient only has 5 pills left of buspirone.  Will increase dosage of buspirone from 5 mg twice daily to 10 mg twice daily.

## 2022-09-22 ENCOUNTER — TELEPHONE (OUTPATIENT)
Dept: FAMILY MEDICINE CLINIC | Facility: CLINIC | Age: 52
End: 2022-09-22

## 2022-09-22 NOTE — TELEPHONE ENCOUNTER
Caller: Sharee Cheek    Relationship to patient: Self    Best call back number: [phone]    Patient is needing: PATIENT STATED THAT SHE HAS BEEN HAVING SOME ISSUES WITH CONTACTING OBGYN OFFICE ( IS PROVIDER)    PATIENT WOULD LIKE TO KNOW  IF OUR OFFICE COULD HELP WITH GETTING APPOINTMENT WITH THAT OFFICE BECAUSE SHE HAS BEEN TRYING FOR A COUPLE WEEKS WITH NO RESPONSE ANY DAY IN January OTHER THAN THE 10TH    PATIENT WOULD LIKE ANY DAY IN January EXCEPT THE 10TH  OBGYN TELEPHONE NUMBER [phone]    PLEASE ADVISE

## 2022-11-10 RX ORDER — LEVOTHYROXINE SODIUM 100 MCG
100 TABLET ORAL DAILY
Qty: 30 TABLET | Refills: 5 | Status: SHIPPED | OUTPATIENT
Start: 2022-11-10

## 2022-12-15 ENCOUNTER — OFFICE VISIT (OUTPATIENT)
Dept: FAMILY MEDICINE CLINIC | Facility: CLINIC | Age: 52
End: 2022-12-15

## 2022-12-15 ENCOUNTER — LAB (OUTPATIENT)
Dept: LAB | Facility: HOSPITAL | Age: 52
End: 2022-12-15

## 2022-12-15 VITALS
HEIGHT: 64 IN | SYSTOLIC BLOOD PRESSURE: 136 MMHG | WEIGHT: 224.2 LBS | DIASTOLIC BLOOD PRESSURE: 84 MMHG | OXYGEN SATURATION: 97 % | TEMPERATURE: 99.3 F | BODY MASS INDEX: 38.28 KG/M2 | HEART RATE: 88 BPM

## 2022-12-15 DIAGNOSIS — J06.9 UPPER RESPIRATORY TRACT INFECTION, UNSPECIFIED TYPE: ICD-10-CM

## 2022-12-15 DIAGNOSIS — J06.9 UPPER RESPIRATORY TRACT INFECTION, UNSPECIFIED TYPE: Primary | ICD-10-CM

## 2022-12-15 DIAGNOSIS — R05.9 COUGH, UNSPECIFIED TYPE: ICD-10-CM

## 2022-12-15 PROCEDURE — U0004 COV-19 TEST NON-CDC HGH THRU: HCPCS

## 2022-12-15 PROCEDURE — 87880 STREP A ASSAY W/OPTIC: CPT | Performed by: FAMILY MEDICINE

## 2022-12-15 PROCEDURE — 87804 INFLUENZA ASSAY W/OPTIC: CPT | Performed by: FAMILY MEDICINE

## 2022-12-15 PROCEDURE — 99213 OFFICE O/P EST LOW 20 MIN: CPT | Performed by: FAMILY MEDICINE

## 2022-12-15 RX ORDER — ALBUTEROL SULFATE 90 UG/1
2 AEROSOL, METERED RESPIRATORY (INHALATION) EVERY 4 HOURS PRN
Qty: 8 G | Refills: 0 | Status: SHIPPED | OUTPATIENT
Start: 2022-12-15

## 2022-12-15 NOTE — ASSESSMENT & PLAN NOTE
Likely initially a viral URI with no evidence of secondary bacterial infection  Discussed use of saline nasal rinses and medications as prescribed  Continue allergy medications, Tylenol/ibuprofen as needed.  If symptoms do not improve patient to return to clinic for reevaluation

## 2022-12-15 NOTE — PROGRESS NOTES
"Chief Complaint  Fever (Sore throat, ear pain, dry cough, headache, bodyaches)    Subjective        Sharee Cheek presents to [facility]  URI   This is a new problem. The current episode started in the past 7 days. The problem has been waxing and waning. There has been no fever. Associated symptoms include congestion, coughing, ear pain, nausea, rhinorrhea, sinus pain and sneezing. Pertinent negatives include no abdominal pain or dysuria. She has tried NSAIDs, sleep, increased fluids and acetaminophen for the symptoms. The treatment provided mild relief.       Objective   Vital Signs:  /84   Pulse 88   Temp 99.3 °F (37.4 °C) (Infrared)   Ht 162.6 cm (64.02\")   Wt 102 kg (224 lb 3.2 oz)   SpO2 97%   BMI 38.46 kg/m²   Estimated body mass index is 38.46 kg/m² as calculated from the following:    Height as of this encounter: 162.6 cm (64.02\").    Weight as of this encounter: 102 kg (224 lb 3.2 oz).    Class 2 Severe Obesity (BMI >=35 and <=39.9). Obesity-related health conditions include the following: diabetes mellitus. Obesity is unchanged. BMI is is above average; BMI management plan is completed. We discussed low calorie, low carb based diet program, portion control and increasing exercise. (shared in AVS)       Physical Exam  Constitutional:       Appearance: Normal appearance. She is normal weight.   HENT:      Right Ear: A middle ear effusion is present. Tympanic membrane is not erythematous.      Left Ear: A middle ear effusion is present. Tympanic membrane is not erythematous.   Cardiovascular:      Rate and Rhythm: Normal rate and regular rhythm.      Pulses: Normal pulses.      Heart sounds: Normal heart sounds.   Pulmonary:      Effort: Pulmonary effort is normal.      Breath sounds: Normal breath sounds.   Abdominal:      General: Abdomen is flat.   Skin:     Capillary Refill: Capillary refill takes less than 2 seconds.   Neurological:      General: No focal " deficit present.      Mental Status: She is alert.   Psychiatric:         Mood and Affect: Mood normal.         Behavior: Behavior normal.         Thought Content: Thought content normal.         Judgment: Judgment normal.        Result Review :          Assessment and Plan   Diagnoses and all orders for this visit:    1. Upper respiratory tract infection, unspecified type (Primary)  Assessment & Plan:  Likely initially a viral URI with no evidence of secondary bacterial infection  Discussed use of saline nasal rinses and medications as prescribed  Continue allergy medications, Tylenol/ibuprofen as needed.  If symptoms do not improve patient to return to clinic for reevaluation    Orders:  -     COVID-19 PCR, [facility], NP SWAB IN [facility]AL TRANSPORT MEDIA/ORAL SWISH 24-30 HR TAT - Swab, Nasopharynx; Future  -     albuterol sulfate  (90 Base) MCG/ACT inhaler; Inhale 2 puffs Every 4 (Four) Hours As Needed for Wheezing.  Dispense: 8 g; Refill: 0    2. Cough, unspecified type  -     POCT Influenza A/B  -     POCT rapid strep A         Follow Up   No follow-ups on file.  Patient was given instructions and counseling regarding her condition or for health maintenance advice. Please see specific information pulled into the AVS if appropriate.       This document has been electronically signed by Sveta Cheng DO   December 15, 2022 12:00 EST    Dictated Utilizing Dragon Dictation: Part of this note may be an electronic transcription/translation of spoken language to printed text using the Dragon Dictation System.    Sveta Cheng D.O.  [facility] [place] [facility] [place]

## 2022-12-16 ENCOUNTER — TELEPHONE (OUTPATIENT)
Dept: FAMILY MEDICINE CLINIC | Facility: CLINIC | Age: 52
End: 2022-12-16

## 2022-12-16 LAB — SARS-COV-2 RNA NOSE QL NAA+PROBE: NOT DETECTED

## 2022-12-16 NOTE — TELEPHONE ENCOUNTER
Caller: Sharee Cheek    Relationship: Self    Best call back number: [phone]- OK TO LEAVE DETAILED MESSAGE IF NO ANSWER    Caller requesting test results: SELF    What test was performed: PCR    When was the test performed: 12/15/22    Where was the test performed: IN OFFICE AT 10 AM    Additional notes: PLEASE CALL WITH RESULTS

## 2022-12-17 ENCOUNTER — OFFICE VISIT (OUTPATIENT)
Dept: FAMILY MEDICINE CLINIC | Facility: CLINIC | Age: 52
End: 2022-12-17

## 2022-12-17 VITALS
DIASTOLIC BLOOD PRESSURE: 90 MMHG | TEMPERATURE: 98.6 F | OXYGEN SATURATION: 98 % | RESPIRATION RATE: 21 BRPM | SYSTOLIC BLOOD PRESSURE: 140 MMHG | HEART RATE: 94 BPM | BODY MASS INDEX: 38.17 KG/M2 | WEIGHT: 223.6 LBS | HEIGHT: 64 IN

## 2022-12-17 DIAGNOSIS — J02.9 SORE THROAT: Primary | ICD-10-CM

## 2022-12-17 DIAGNOSIS — J06.9 VIRAL URI WITH COUGH: ICD-10-CM

## 2022-12-17 DIAGNOSIS — H65.02 ACUTE SEROUS OTITIS MEDIA OF LEFT EAR, RECURRENCE NOT SPECIFIED: ICD-10-CM

## 2022-12-17 PROCEDURE — 87880 STREP A ASSAY W/OPTIC: CPT | Performed by: NURSE PRACTITIONER

## 2022-12-17 PROCEDURE — 99213 OFFICE O/P EST LOW 20 MIN: CPT | Performed by: NURSE PRACTITIONER

## 2022-12-17 PROCEDURE — 87804 INFLUENZA ASSAY W/OPTIC: CPT | Performed by: NURSE PRACTITIONER

## 2022-12-17 RX ORDER — FLUTICASONE PROPIONATE 50 MCG
2 SPRAY, SUSPENSION (ML) NASAL DAILY
Qty: 15.8 ML | Refills: 0 | Status: SHIPPED | OUTPATIENT
Start: 2022-12-17 | End: 2023-03-18

## 2022-12-17 RX ORDER — AZITHROMYCIN 250 MG/1
TABLET, FILM COATED ORAL
Qty: 6 TABLET | Refills: 0 | Status: SHIPPED | OUTPATIENT
Start: 2022-12-17 | End: 2022-12-19

## 2022-12-17 RX ORDER — BENZONATATE 200 MG/1
200 CAPSULE ORAL 3 TIMES DAILY PRN
Qty: 30 CAPSULE | Refills: 0 | Status: SHIPPED | OUTPATIENT
Start: 2022-12-17 | End: 2022-12-27

## 2022-12-17 NOTE — PROGRESS NOTES
"Chief Complaint  Earache (Pt has been having ear pain for a while that has worsened. Pt is also having body aches, sore throat, fatigue and headache. )    Subjective        Sharee Cheek presents to [facility]  History of Present Illness  Symptoms of aching, frontal headache, bilateral ear pain, greenish to clear nasal drainage, fever, sore throat, occasional productive cough and wheezing. Pain with inspiration. Diarrhea yesterday. No nausea or vomiting.    Objective   Vital Signs:  /90   Pulse 94   Temp 98.6 °F (37 °C) (Temporal)   Resp 21   Ht 162.6 cm (64.02\")   Wt 101 kg (223 lb 9.6 oz)   SpO2 98%   BMI 38.36 kg/m²   Estimated body mass index is 38.36 kg/m² as calculated from the following:    Height as of this encounter: 162.6 cm (64.02\").    Weight as of this encounter: 101 kg (223 lb 9.6 oz).          Physical Exam  Constitutional:       Appearance: Normal appearance.   HENT:      Head: Normocephalic.      Right Ear: Tympanic membrane, ear canal and external ear normal.      Left Ear: Ear canal and external ear normal.      Ears:      Comments: Left tm bulging, landmarks absent, erythema upper tympanic membrane     Nose: Nose normal.      Mouth/Throat:      Mouth: Mucous membranes are moist.      Pharynx: Posterior oropharyngeal erythema present.   Eyes:      Conjunctiva/sclera: Conjunctivae normal.   Cardiovascular:      Rate and Rhythm: Normal rate and regular rhythm.      Heart sounds: Normal heart sounds.   Pulmonary:      Effort: Pulmonary effort is normal.      Breath sounds: Normal breath sounds.   Musculoskeletal:      Cervical back: Neck supple.   Lymphadenopathy:      Cervical: No cervical adenopathy.   Skin:     General: Skin is warm and dry.   Neurological:      General: No focal deficit present.      Mental Status: She is alert.   Psychiatric:         Behavior: Behavior normal.         Thought Content: Thought content normal.         Judgment: " Judgment normal.        Result Review :                Assessment and Plan   Diagnoses and all orders for this visit:    1. Sore throat (Primary)  -     POCT rapid strep A    2. Acute serous otitis media of left ear, recurrence not specified  -     fluticasone (Flonase) 50 MCG/ACT nasal spray; 2 sprays into the nostril(s) as directed by provider Daily.  Dispense: 15.8 mL; Refill: 0  -     azithromycin (Zithromax Z-Collin) 250 MG tablet; Take 2 tablets by mouth on day 1, then 1 tablet daily on days 2-5  Dispense: 6 tablet; Refill: 0    3. Viral URI with cough  -     POCT Influenza A/B  -     benzonatate (TESSALON) 200 MG capsule; Take 1 capsule by mouth 3 (Three) Times a Day As Needed for Cough for up to 10 days.  Dispense: 30 capsule; Refill: 0    Medication as directed. Return for persistent/progressive symptoms.          Follow Up   No follow-ups on file.  Patient was given instructions and counseling regarding her condition or for health maintenance advice. Please see specific information pulled into the AVS if appropriate.

## 2022-12-19 ENCOUNTER — TELEPHONE (OUTPATIENT)
Dept: FAMILY MEDICINE CLINIC | Facility: CLINIC | Age: 52
End: 2022-12-19

## 2022-12-19 RX ORDER — DOXYCYCLINE 100 MG/1
100 CAPSULE ORAL 2 TIMES DAILY
Qty: 14 CAPSULE | Refills: 0 | Status: SHIPPED | OUTPATIENT
Start: 2022-12-19 | End: 2023-01-03

## 2022-12-19 NOTE — TELEPHONE ENCOUNTER
Hub can read :    She needs to start the new antibiotic doxycycline which I sent in.  As far as the blood in the ear, I cannot give any recommendations without examining the ear.  If her symptoms are severe, please help her schedule visit ASAP or she may need to go to urgent care to have an ear exam.     Lvm for pt to call back

## 2022-12-19 NOTE — TELEPHONE ENCOUNTER
Caller: Kyle Cheek    Relationship: Self    Best call back number: [phone]    What is the best time to reach you: ANY TIME    Who are you requesting to speak with (clinical staff, provider,  specific staff member): CLINICAL STAFF    Do you know the name of the person who called: KYLE    What was the call regarding: PATIENT WAS SEEN ON Thursday AND Saturday AND STATES THAT THE MEDICATION SHE HAS BEEN PRESCRIBED HAS NOT BEEN HELPING AT ALL. SHE IS REQUESTING AN ALTERNATIVE ANTIBIOTIC TO HELP WITH HER INCREASINGLY PAINFUL SYMPTOMS.     SHE STATES THAT SHE IS CURRENTLY EXPERIENCING EXTREME EAR PAIN, LOW GRADE FEVER, HEAD PAIN, AND IS COUGHING UP PHLEGM.    PLEASE ADVISE PATIENT ON WHAT CAN BE DONE TO HELP SYMPTOMS OR IF WE NEED HER TO BE SEEN AGAIN.     Do you require a callback: YES

## 2022-12-28 ENCOUNTER — TELEPHONE (OUTPATIENT)
Dept: FAMILY MEDICINE CLINIC | Facility: CLINIC | Age: 52
End: 2022-12-28

## 2022-12-28 NOTE — TELEPHONE ENCOUNTER
Caller: Sharee Cheek    Relationship to patient: Self    Best call back number: [phone]    Patient is needing: PATIENT STATES SHE NEEDS THE FORM COMPLETED IN ORDER FOR HER TO BE PAID. PATIENT SAW DR BLANDON FOR SEVERE EAR INFECTION.     PATIENT'S EMPLOYER IS [employer]-FORM WILL COME FROM [place]  FORM WAS FAXED LAST WEEK    PATIENT STATES SHE IS STILL HAVING HEARING ISSUES AND WANTING  KNOW IF THIS IS NORMAL.

## 2022-12-29 NOTE — TELEPHONE ENCOUNTER
I called the pt and she states that she was not seen by urgent care and that nobody took her off work but that she wasn't able to work. She says that on the Saturday that she was seen she came to our location and saw an APRN.

## 2022-12-30 NOTE — TELEPHONE ENCOUNTER
I called the pt and she states that she will have the paperwork refaxed. She is still concerned about having hearing issues. She has to ask everyone to repeat themselves multiple times and can only hear on the phone if she turned her volume up very loud.

## 2023-01-03 ENCOUNTER — OFFICE VISIT (OUTPATIENT)
Dept: FAMILY MEDICINE CLINIC | Facility: CLINIC | Age: 53
End: 2023-01-03
Payer: COMMERCIAL

## 2023-01-03 VITALS
HEART RATE: 87 BPM | HEIGHT: 65 IN | BODY MASS INDEX: 38.24 KG/M2 | TEMPERATURE: 97.8 F | SYSTOLIC BLOOD PRESSURE: 132 MMHG | WEIGHT: 229.5 LBS | OXYGEN SATURATION: 99 % | DIASTOLIC BLOOD PRESSURE: 90 MMHG

## 2023-01-03 DIAGNOSIS — H69.93 EUSTACHIAN TUBE DISORDER, BILATERAL: ICD-10-CM

## 2023-01-03 DIAGNOSIS — R53.83 OTHER FATIGUE: Primary | ICD-10-CM

## 2023-01-03 LAB
EXPIRATION DATE: NORMAL
HETEROPH AB SER QL LA: NEGATIVE
INTERNAL CONTROL: NORMAL
Lab: NORMAL

## 2023-01-03 PROCEDURE — 99213 OFFICE O/P EST LOW 20 MIN: CPT | Performed by: PHYSICIAN ASSISTANT

## 2023-01-03 PROCEDURE — 86308 HETEROPHILE ANTIBODY SCREEN: CPT | Performed by: PHYSICIAN ASSISTANT

## 2023-01-03 RX ORDER — METHYLPREDNISOLONE 4 MG/1
TABLET ORAL
Qty: 21 TABLET | Refills: 0 | Status: SHIPPED | OUTPATIENT
Start: 2023-01-03 | End: 2023-02-23

## 2023-01-03 NOTE — PROGRESS NOTES
Follow Up Office Visit      Date: 2023   Patient Name: Sharee Cheek  : 1970   MRN: [id number]     Chief Complaint:    Chief Complaint   Patient presents with   • Ear Fullness     Follow up on ears    Pt is having pressure, having issues hearing, over the counter decongestant isn't working, cough, no sore throat, no fever, no body aches. Constant headache       History of Present Illness: Sharee Cheek is a 52 y.o. female who is here today to follow up with ear pressure, having problems hearing headaches.  She has been using nasal spray decongestants.  She has had Zithromax as well.  She continues to have bilateral problems with her ears.  No fever, no chest pain or shortness of breath.  Has had a lot of fatigue with the symptoms and was concerned about mono also.      Subjective      Review of systems:  Review of Systems   Constitutional: Positive for fatigue. Negative for fever.   HENT: Positive for ear pain and hearing loss.    Respiratory: Negative for shortness of breath.    Cardiovascular: Negative for chest pain and leg swelling.        I have reviewed and the following portions of the patient's history were updated as appropriate: past family history, past medical history, past social history, past surgical history and problem list.    Medications:     Current Outpatient Medications:   •  albuterol sulfate  (90 Base) MCG/ACT inhaler, Inhale 2 puffs Every 4 (Four) Hours As Needed for Wheezing., Disp: 8 g, Rfl: 0  •  busPIRone (BUSPAR) 10 MG tablet, Take 1 tablet by mouth 2 (Two) Times a Day., Disp: 30 tablet, Rfl: 1  •  fluticasone (Flonase) 50 MCG/ACT nasal spray, 2 sprays into the nostril(s) as directed by provider Daily., Disp: 15.8 mL, Rfl: 0  •  Jublia 10 % solution, , Disp: , Rfl:   •  lisinopril (PRINIVIL,ZESTRIL) 40 MG tablet, Take 1 tablet by mouth Daily. No further refills until follow-up visit., Disp: 30 tablet, Rfl: 3  •  Synthroid 100 MCG tablet, Take 1 tablet by  mouth Daily. NEEDS APPT FOR MORE REFILLS, Disp: 30 tablet, Rfl: 5  •  methylPREDNISolone (MEDROL) 4 MG dose pack, Take as directed on package instructions., Disp: 21 tablet, Rfl: 0    Allergies:   Allergies   Allergen Reactions   • Ibuprofen Anaphylaxis   • Acetaminophen Other (See Comments)     Atypical sedation     • Keflex [Cephalexin]    • Penicillins        Objective     Vital Signs:   Vitals:    01/03/23 1447   BP: 132/90   Pulse: 87   Temp: 97.8 °F (36.6 °C)   TempSrc: Infrared   SpO2: 99%   Weight: 104 kg (229 lb 8 oz)   Height: 163.8 cm (64.5\")   PainSc:   2     Body mass index is 38.79 kg/m².   Class 2 Severe Obesity (BMI >=35 and <=39.9). Obesity-related health conditions include the following: none. Obesity is unchanged. BMI is is above average; BMI management plan is completed. We discussed low calorie, low carb based diet program, portion control and increasing exercise.      Physical Exam:   Physical Exam  Vitals and nursing note reviewed.   Constitutional:       Appearance: Normal appearance.   HENT:      Head: Normocephalic and atraumatic.      Right Ear: Ear canal normal. A middle ear effusion is present. Tympanic membrane is not erythematous.      Left Ear: Ear canal normal. A middle ear effusion is present. Tympanic membrane is not erythematous.      Nose: No congestion or rhinorrhea.      Mouth/Throat:      Mouth: Mucous membranes are moist.      Pharynx: Oropharynx is clear. No posterior oropharyngeal erythema.   Cardiovascular:      Rate and Rhythm: Normal rate and regular rhythm.   Pulmonary:      Effort: Pulmonary effort is normal.      Breath sounds: Normal breath sounds. No decreased breath sounds, wheezing, rhonchi or rales.   Musculoskeletal:      Cervical back: Neck supple.      Right lower leg: No edema.      Left lower leg: No edema.   Lymphadenopathy:      Cervical: No cervical adenopathy.   Neurological:      Mental Status: She is alert.          Assessment / Plan       Assessment/Plan:   Diagnoses and all orders for this visit:    1. Other fatigue (Primary)  -     POCT Infectious mononucleosis antibody    2. Eustachian tube disorder, bilateral  -     methylPREDNISolone (MEDROL) 4 MG dose pack; Take as directed on package instructions.  Dispense: 21 tablet; Refill: 0    Monospot negative.  Continue Flonase and decongestants.  We will add Medrol Dosepak as directed.  If this does not resolve her ear issues I recommend she see ENT.    Follow Up:   No follow-ups on file.    Odilia Cote PA-C   [facility] [place] [facility] [place]

## 2023-01-17 ENCOUNTER — TELEPHONE (OUTPATIENT)
Dept: ENDOCRINOLOGY | Facility: CLINIC | Age: 53
End: 2023-01-17
Payer: COMMERCIAL

## 2023-01-26 DIAGNOSIS — F41.9 ANXIETY: ICD-10-CM

## 2023-01-26 RX ORDER — BUSPIRONE HYDROCHLORIDE 10 MG/1
10 TABLET ORAL 2 TIMES DAILY
Qty: 60 TABLET | Refills: 2 | Status: SHIPPED | OUTPATIENT
Start: 2023-01-26 | End: 2023-03-18

## 2023-01-26 NOTE — TELEPHONE ENCOUNTER
Rx Refill Note  Requested Prescriptions     Pending Prescriptions Disp Refills   • busPIRone (BUSPAR) 10 MG tablet 30 tablet 1     Sig: Take 1 tablet by mouth 2 (Two) Times a Day.      Last office visit with prescribing clinician: 6/2/2021   Last telemedicine visit with prescribing clinician: Visit date not found   Next office visit with prescribing clinician: Visit date not found                         Would you like a call back once the refill request has been completed: [] Yes [] No    If the office needs to give you a call back, can they leave a voicemail: [] Yes [] No    Keenan Miles MA  01/26/23, 15:46 EST

## 2023-01-28 ENCOUNTER — PATIENT MESSAGE (OUTPATIENT)
Dept: ENDOCRINOLOGY | Facility: CLINIC | Age: 53
End: 2023-01-28
Payer: COMMERCIAL

## 2023-01-30 DIAGNOSIS — I10 PRIMARY HYPERTENSION: Chronic | ICD-10-CM

## 2023-01-30 DIAGNOSIS — Z76.0 MEDICATION REFILL: ICD-10-CM

## 2023-01-30 RX ORDER — LISINOPRIL 40 MG/1
40 TABLET ORAL DAILY
Qty: 30 TABLET | Refills: 1 | Status: SHIPPED | OUTPATIENT
Start: 2023-01-30 | End: 2023-02-23 | Stop reason: SDUPTHER

## 2023-01-30 NOTE — TELEPHONE ENCOUNTER
Rx Refill Note  Requested Prescriptions     Pending Prescriptions Disp Refills   • lisinopril (PRINIVIL,ZESTRIL) 40 MG tablet 30 tablet 3     Sig: Take 1 tablet by mouth Daily. No further refills until follow-up visit.      Last office visit with prescribing clinician: 6/2/2021   Last telemedicine visit with prescribing clinician: Visit date not found   Next office visit with prescribing clinician: Visit date not found                         Would you like a call back once the refill request has been completed: [] Yes [] No    If the office needs to give you a call back, can they leave a voicemail: [] Yes [] No    Mayela Bell  01/30/23, 11:22 EST

## 2023-01-31 ENCOUNTER — TELEPHONE (OUTPATIENT)
Dept: FAMILY MEDICINE CLINIC | Facility: CLINIC | Age: 53
End: 2023-01-31
Payer: COMMERCIAL

## 2023-01-31 NOTE — TELEPHONE ENCOUNTER
Called Pt to schedule annual for her med refills. Left message asking to call back to get scheduled.

## 2023-02-01 DIAGNOSIS — Z76.0 MEDICATION REFILL: ICD-10-CM

## 2023-02-01 DIAGNOSIS — I10 PRIMARY HYPERTENSION: Chronic | ICD-10-CM

## 2023-02-01 RX ORDER — LISINOPRIL 40 MG/1
40 TABLET ORAL DAILY
Qty: 30 TABLET | Refills: 1 | OUTPATIENT
Start: 2023-02-01

## 2023-02-01 NOTE — TELEPHONE ENCOUNTER
Please schedule a follow up for refills and blood work. It has been over one years since your kidney function was checked.

## 2023-02-01 NOTE — TELEPHONE ENCOUNTER
Rx Refill Note  Requested Prescriptions     Pending Prescriptions Disp Refills   • lisinopril (PRINIVIL,ZESTRIL) 40 MG tablet 30 tablet 1     Sig: Take 1 tablet by mouth Daily. No further refills until follow-up visit.      Last office visit with prescribing clinician: 6/2/2021   Last telemedicine visit with prescribing clinician: Visit date not found   Next office visit with prescribing clinician: Visit date not found                         Would you like a call back once the refill request has been completed: [] Yes [] No    If the office needs to give you a call back, can they leave a voicemail: [] Yes [] No    Keenan Miles MA  02/01/23, 15:56 EST

## 2023-02-02 ENCOUNTER — TELEPHONE (OUTPATIENT)
Dept: FAMILY MEDICINE CLINIC | Facility: CLINIC | Age: 53
End: 2023-02-02
Payer: COMMERCIAL

## 2023-02-02 NOTE — TELEPHONE ENCOUNTER
CALLED PATIENT TO SCHEDULE HOWEVER WE ARE WAITING ON DR BLANDON TO OKAY THE PCP CHANGE.      PATIENT WOULD LIKE TO SCHEDULE APPOINTMENT FOR ENDING OF April OR MAY.  PATIENT REQUESTED IT BE TUESDAYS EARLY MORNING OR Friday EARLY MORNING.  Tuesday WOULD BE FIRST CHOICE.

## 2023-02-20 DIAGNOSIS — Z76.0 MEDICATION REFILL: ICD-10-CM

## 2023-02-20 DIAGNOSIS — I10 PRIMARY HYPERTENSION: Chronic | ICD-10-CM

## 2023-02-20 RX ORDER — LISINOPRIL 40 MG/1
40 TABLET ORAL DAILY
Qty: 30 TABLET | Refills: 1 | OUTPATIENT
Start: 2023-02-20

## 2023-02-20 NOTE — TELEPHONE ENCOUNTER
Rx Refill Note  Requested Prescriptions     Pending Prescriptions Disp Refills   • lisinopril (PRINIVIL,ZESTRIL) 40 MG tablet 30 tablet 1     Sig: Take 1 tablet by mouth Daily. No further refills until follow-up visit.      Last office visit with prescribing clinician: 12/15/2022   Last telemedicine visit with prescribing clinician: Visit date not found   Next office visit with prescribing clinician: Visit date not found                         Would you like a call back once the refill request has been completed: [] Yes [] No    If the office needs to give you a call back, can they leave a voicemail: [] Yes [] No    Keenan Miles MA  02/20/23, 15:55 EST

## 2023-02-23 ENCOUNTER — TELEMEDICINE (OUTPATIENT)
Dept: FAMILY MEDICINE CLINIC | Facility: CLINIC | Age: 53
End: 2023-02-23
Payer: COMMERCIAL

## 2023-02-23 DIAGNOSIS — G89.29 CHRONIC LEFT-SIDED LOW BACK PAIN WITH LEFT-SIDED SCIATICA: ICD-10-CM

## 2023-02-23 DIAGNOSIS — M54.42 CHRONIC LEFT-SIDED LOW BACK PAIN WITH LEFT-SIDED SCIATICA: ICD-10-CM

## 2023-02-23 DIAGNOSIS — I10 PRIMARY HYPERTENSION: Primary | Chronic | ICD-10-CM

## 2023-02-23 PROBLEM — M75.42 IMPINGEMENT SYNDROME OF LEFT SHOULDER: Status: RESOLVED | Noted: 2020-10-01 | Resolved: 2023-02-23

## 2023-02-23 PROBLEM — F34.1 DYSTHYMIA: Status: RESOLVED | Noted: 2022-09-16 | Resolved: 2023-02-23

## 2023-02-23 PROBLEM — J06.9 UPPER RESPIRATORY TRACT INFECTION: Status: RESOLVED | Noted: 2022-12-15 | Resolved: 2023-02-23

## 2023-02-23 PROCEDURE — 99213 OFFICE O/P EST LOW 20 MIN: CPT | Performed by: FAMILY MEDICINE

## 2023-02-23 RX ORDER — LISINOPRIL 40 MG/1
40 TABLET ORAL DAILY
Qty: 30 TABLET | Refills: 2 | Status: SHIPPED | OUTPATIENT
Start: 2023-02-23

## 2023-02-23 NOTE — ASSESSMENT & PLAN NOTE
She has chronic low back pain that is intermittent at times, along with hip pain.    She is inquiring about a standing desk at work.  I do think she would benefit from this.  A letter was placed in her MyChart

## 2023-02-23 NOTE — PROGRESS NOTES
Telehealth E-Visit      Date: 2023   Patient Name: Sharee Cheek  : 1970   MRN: [id number]     Chief Complaint:  No chief complaint on file.      I have reviewed the E-Visit questionnaire and the patient's answers, my assessment and plan are listed below.     This provider is located at the [facility] Location (through [facility]) using a secure [employer] Video Visit through [employer]. Patient is being seen remotely via telehealth at their home address in [place], and stated they are in a secure environment for this session. The patient's condition being diagnosed/treated is appropriate for telemedicine. The provider identified herself as well as her credentials. The patient, and/or patients guardian, consent to be seen remotely, and when consent is given they understand that the consent allows for patient identifiable information to be sent to a third party as needed. They may refuse to be seen remotely at any time. The electronic data is encrypted and password protected, and the patient and/or guardian has been advised of the potential risks to privacy not withstanding such measures.    You have chosen to receive care through a telehealth visit. Do you consent to use a video/audio connection for your medical care today? Yes    History of Present Illness: Sharee Cheek is a 52 y.o. female who is here today for medication refills.     She has a history of hypertension, prediabetes, hypothyroidism, anxiety (presdominately stress induced).      Subjective      Review of Systems:   Review of Systems   Constitutional: Negative.    Cardiovascular: Negative.    Gastrointestinal: Negative.    Musculoskeletal: Negative.    Neurological: Negative.    Psychiatric/Behavioral: Negative.        I have reviewed and the following portions of the patient's history were updated as appropriate: past family history, past medical history, past social history, past surgical history and problem  list.    Medications:     Current Outpatient Medications:   •  lisinopril (PRINIVIL,ZESTRIL) 40 MG tablet, Take 1 tablet by mouth Daily., Disp: 30 tablet, Rfl: 2  •  albuterol sulfate  (90 Base) MCG/ACT inhaler, Inhale 2 puffs Every 4 (Four) Hours As Needed for Wheezing., Disp: 8 g, Rfl: 0  •  busPIRone (BUSPAR) 10 MG tablet, Take 1 tablet by mouth 2 (Two) Times a Day., Disp: 60 tablet, Rfl: 2  •  fluticasone (Flonase) 50 MCG/ACT nasal spray, 2 sprays into the nostril(s) as directed by provider Daily., Disp: 15.8 mL, Rfl: 0  •  Jublia 10 % solution, , Disp: , Rfl:   •  Synthroid 100 MCG tablet, Take 1 tablet by mouth Daily. NEEDS APPT FOR MORE REFILLS, Disp: 30 tablet, Rfl: 5    Allergies:   Allergies   Allergen Reactions   • Ibuprofen Anaphylaxis   • Acetaminophen Other (See Comments)     Atypical sedation     • Keflex [Cephalexin]    • Penicillins        Objective     Physical Exam:  Vital Signs: There were no vitals filed for this visit.  There is no height or weight on file to calculate BMI.    Physical Exam  Constitutional:       Appearance: Normal appearance.   Cardiovascular:      Rate and Rhythm: Normal rate.   Pulmonary:      Effort: Pulmonary effort is normal.   Neurological:      Mental Status: She is alert.   Psychiatric:         Mood and Affect: Mood normal.         Thought Content: Thought content normal.           Assessment / Plan      Assessment/Plan:   Diagnoses and all orders for this visit:    1. Primary hypertension (Primary)  Assessment & Plan:  Hypertension is stable.  Continue current treatment regimen.  Blood pressure will be reassessed at the next regular appointment.    Orders:  -     lisinopril (PRINIVIL,ZESTRIL) 40 MG tablet; Take 1 tablet by mouth Daily.  Dispense: 30 tablet; Refill: 2    2. Chronic left-sided low back pain with left-sided sciatica  Assessment & Plan:  She has chronic low back pain that is intermittent at times, along with hip pain.    She is inquiring about a  standing desk at work.  I do think she would benefit from this.  A letter was placed in her MyChart          Follow Up:   Return for Next scheduled follow up.    Any medications prescribed have been sent electronically to   [facility] [id number] - [place], [place] - [address] AT [facility] [address] - [phone] PH - [phone] FX  [address]  [address]  [place] [id number]  Phone: [phone] Fax: [phone]    [facility]/pharmacy #[id number] - [place], [place] - [address] - [phone]  - [phone] FX [address] [place] [address]  Phone: [phone] Fax: [phone]          This document has been electronically signed by Sveta Cheng DO   February 23, 2023 13:15 EST    Dictated Utilizing Dragon Dictation: Part of this note may be an electronic transcription/translation of spoken language to printed text using the Dragon Dictation System.    Sveta Cheng D.O.  [facility] [place]

## 2023-03-18 ENCOUNTER — OFFICE VISIT (OUTPATIENT)
Dept: FAMILY MEDICINE CLINIC | Facility: CLINIC | Age: 53
End: 2023-03-18
Payer: COMMERCIAL

## 2023-03-18 VITALS
DIASTOLIC BLOOD PRESSURE: 82 MMHG | OXYGEN SATURATION: 99 % | HEIGHT: 64 IN | SYSTOLIC BLOOD PRESSURE: 131 MMHG | WEIGHT: 229 LBS | BODY MASS INDEX: 39.09 KG/M2 | TEMPERATURE: 98.2 F | HEART RATE: 87 BPM

## 2023-03-18 DIAGNOSIS — J01.00 ACUTE NON-RECURRENT MAXILLARY SINUSITIS: Primary | ICD-10-CM

## 2023-03-18 PROCEDURE — 99213 OFFICE O/P EST LOW 20 MIN: CPT | Performed by: NURSE PRACTITIONER

## 2023-03-18 RX ORDER — TRAZODONE HYDROCHLORIDE 50 MG/1
1 TABLET ORAL DAILY
COMMUNITY
Start: 2023-01-15

## 2023-03-18 RX ORDER — RIZATRIPTAN BENZOATE 10 MG/1
10 TABLET, ORALLY DISINTEGRATING ORAL AS NEEDED
COMMUNITY
Start: 2023-01-28

## 2023-03-18 RX ORDER — DOXYCYCLINE 100 MG/1
100 CAPSULE ORAL 2 TIMES DAILY
Qty: 28 CAPSULE | Refills: 0 | Status: SHIPPED | OUTPATIENT
Start: 2023-03-18 | End: 2023-04-01

## 2023-03-18 RX ORDER — TRIAMCINOLONE ACETONIDE 55 UG/1
SPRAY, METERED NASAL
COMMUNITY
Start: 2023-01-10 | End: 2023-03-18

## 2023-03-18 NOTE — PROGRESS NOTES
Follow Up Office Note     Patient Name: Sharee Cheek  : 1970   MRN: [id number]     Chief Complaint:    Chief Complaint   Patient presents with   • Cough     Per patient she has sinus headache, productive cough- yellow/brown mucous. States she has post nasal drip and feels she might sinus infection.   • Sinus Problem       History of Present Illness: Sharee Cheek is a 52 y.o. female who presents today with complaint of sinus congestion, headache, postnasal drainage and productive cough with yellow/brown sputum times approximately 2 weeks.  Patient denies fever, chills, body aches.    Subjective      I have reviewed and the following portions of the patient's history were updated as appropriate: past family history, past medical history, past social history, past surgical history and problem list.    Review of Systems:   Review of Systems   Constitutional: Positive for fatigue. Negative for appetite change, chills, diaphoresis and fever.   HENT: Positive for congestion, ear pain, postnasal drip, sinus pressure and sinus pain. Negative for ear discharge and sore throat.    Respiratory: Positive for cough. Negative for shortness of breath, wheezing and stridor.    Cardiovascular: Negative.    Gastrointestinal: Negative.    Musculoskeletal: Negative for myalgias.   Neurological: Positive for headaches.        Past Medical History:   Past Medical History:   Diagnosis Date   • History of conjunctivitis    • History of influenza    • History of sinusitis    • Hypertension    • Insomnia          Medications:     Current Outpatient Medications:   •  albuterol sulfate  (90 Base) MCG/ACT inhaler, Inhale 2 puffs Every 4 (Four) Hours As Needed for Wheezing., Disp: 8 g, Rfl: 0  •  lisinopril (PRINIVIL,ZESTRIL) 40 MG tablet, Take 1 tablet by mouth Daily., Disp: 30 tablet, Rfl: 2  •  rizatriptan MLT (MAXALT-MLT) 10 MG disintegrating tablet, 1 tablet As Needed., Disp: , Rfl:   •  Synthroid 100 MCG tablet,  "Take 1 tablet by mouth Daily. NEEDS APPT FOR MORE REFILLS, Disp: 30 tablet, Rfl: 5  •  traZODone (DESYREL) 50 MG tablet, Take 1 tablet by mouth Daily., Disp: , Rfl:   •  doxycycline (MONODOX) 100 MG capsule, Take 1 capsule by mouth 2 (Two) Times a Day for 14 days., Disp: 28 capsule, Rfl: 0    Allergies:   Allergies   Allergen Reactions   • Ibuprofen Anaphylaxis   • Acetaminophen Other (See Comments)     Atypical sedation     • Keflex [Cephalexin]    • Penicillins          Objective     Physical Exam:  Vital Signs:   Vitals:    03/18/23 1138   BP: 131/82   Pulse: 87   Temp: 98.2 °F (36.8 °C)   TempSrc: Infrared   SpO2: 99%   Weight: 104 kg (229 lb)   Height: 163.8 cm (64.49\")   PainSc: 0-No pain     Body mass index is 38.72 kg/m².     Physical Exam  Vitals and nursing note reviewed.   Constitutional:       General: She is not in acute distress.     Appearance: Normal appearance. She is well-developed. She is not ill-appearing, toxic-appearing or diaphoretic.   HENT:      Head: Normocephalic and atraumatic.      Right Ear: External ear normal. A middle ear effusion is present. Tympanic membrane is injected and bulging.      Left Ear: External ear normal. A middle ear effusion is present. Tympanic membrane is injected and bulging.      Nose: Mucosal edema and congestion present.      Right Turbinates: Swollen.      Left Turbinates: Swollen.      Right Sinus: Maxillary sinus tenderness present.      Left Sinus: Maxillary sinus tenderness present.      Mouth/Throat:      Lips: Pink.      Mouth: Mucous membranes are moist.      Pharynx: Uvula midline. Posterior oropharyngeal erythema (with cobblestoning) present.   Cardiovascular:      Rate and Rhythm: Normal rate and regular rhythm.      Heart sounds: Normal heart sounds.   Pulmonary:      Effort: Pulmonary effort is normal. No respiratory distress.      Breath sounds: Normal breath sounds. No stridor. No wheezing.   Musculoskeletal:      Cervical back: Normal range of " motion and neck supple. No rigidity. No muscular tenderness.   Lymphadenopathy:      Cervical: No cervical adenopathy.   Skin:     General: Skin is warm and dry.   Neurological:      Mental Status: She is alert and oriented to person, place, and time.   Psychiatric:         Mood and Affect: Mood normal.         Behavior: Behavior normal. Behavior is cooperative.         Thought Content: Thought content normal.         Judgment: Judgment normal.         Assessment / Plan      Assessment/Plan:   Diagnoses and all orders for this visit:    1. Acute non-recurrent maxillary sinusitis (Primary)  -     doxycycline (MONODOX) 100 MG capsule; Take 1 capsule by mouth 2 (Two) Times a Day for 14 days.  Dispense: 28 capsule; Refill: 0           Follow Up:   PRN and at next scheduled appointment(s) with PCP.    Discussed the nature of the medical condition(s) risks, complications, implications, management, safe and proper use of medications. Encouraged medication compliance, and keeping scheduled follow up appointments with me and any other providers.      RTC if symptoms fail to improve, to ER if symptoms worsen.        *Dictated Utilizing Dragon Dictation   Please note that portions of this note were completed with a voice recognition program.   Part of this note may be an electronic transcription/translation of spoken language to printed text using the Dragon Dictation System. Spelling and/or grammatical errors may exist despite efforts at proofreading.        RIK Kuhn  [facility] [place]

## 2023-04-14 ENCOUNTER — TELEPHONE (OUTPATIENT)
Dept: FAMILY MEDICINE CLINIC | Facility: CLINIC | Age: 53
End: 2023-04-14
Payer: COMMERCIAL

## 2023-04-14 NOTE — TELEPHONE ENCOUNTER
"  Caller: Sharee Cheek \"Nadia\"    Relationship: Self    Best call back number: [phone]    What is the best time to reach you: ANYTIME     Who are you requesting to speak with (clinical staff, provider,  specific staff member): PRACTICE MANAGER         What was the call regarding: THE PATIENT STATES THAT SHE HAS PREVIOUSLY SPOKEN TO THE PRACTICE MANAGER REGARDING A BILLING ISSUES THE PRACTICE MANAGER WAS SUPPOSED TO CORRECTING THE ISSUE BUT SHE HAS NOT RECEIVED A CALL BACK AND CONTINUE TO GET BILLS    Do you require a callback: YES           "

## 2023-05-01 ENCOUNTER — LAB (OUTPATIENT)
Dept: LAB | Facility: HOSPITAL | Age: 53
End: 2023-05-01
Payer: COMMERCIAL

## 2023-05-01 ENCOUNTER — OFFICE VISIT (OUTPATIENT)
Dept: FAMILY MEDICINE CLINIC | Facility: CLINIC | Age: 53
End: 2023-05-01
Payer: COMMERCIAL

## 2023-05-01 VITALS
RESPIRATION RATE: 16 BRPM | HEART RATE: 70 BPM | BODY MASS INDEX: 39.05 KG/M2 | HEIGHT: 65 IN | DIASTOLIC BLOOD PRESSURE: 80 MMHG | OXYGEN SATURATION: 99 % | SYSTOLIC BLOOD PRESSURE: 110 MMHG | WEIGHT: 234.4 LBS | TEMPERATURE: 97.7 F

## 2023-05-01 DIAGNOSIS — E06.3 HYPOTHYROIDISM DUE TO HASHIMOTO'S THYROIDITIS: Chronic | ICD-10-CM

## 2023-05-01 DIAGNOSIS — G47.00 INSOMNIA, UNSPECIFIED TYPE: ICD-10-CM

## 2023-05-01 DIAGNOSIS — Z12.31 ENCOUNTER FOR SCREENING MAMMOGRAM FOR MALIGNANT NEOPLASM OF BREAST: ICD-10-CM

## 2023-05-01 DIAGNOSIS — Z00.00 ENCOUNTER FOR ANNUAL PHYSICAL EXAM: Primary | ICD-10-CM

## 2023-05-01 DIAGNOSIS — Z00.00 ENCOUNTER FOR ANNUAL PHYSICAL EXAM: ICD-10-CM

## 2023-05-01 DIAGNOSIS — R73.03 PREDIABETES: Chronic | ICD-10-CM

## 2023-05-01 DIAGNOSIS — E03.8 HYPOTHYROIDISM DUE TO HASHIMOTO'S THYROIDITIS: Chronic | ICD-10-CM

## 2023-05-01 DIAGNOSIS — I10 PRIMARY HYPERTENSION: Chronic | ICD-10-CM

## 2023-05-01 PROBLEM — G43.009 MIGRAINE WITHOUT AURA AND WITHOUT STATUS MIGRAINOSUS, NOT INTRACTABLE: Status: ACTIVE | Noted: 2023-05-01

## 2023-05-01 LAB
25(OH)D3 SERPL-MCNC: 16.1 NG/ML (ref 30–100)
ALBUMIN SERPL-MCNC: 4.2 G/DL (ref 3.5–5.2)
ALBUMIN/GLOB SERPL: 1.4 G/DL
ALP SERPL-CCNC: 85 U/L (ref 39–117)
ALT SERPL W P-5'-P-CCNC: 12 U/L (ref 1–33)
ANION GAP SERPL CALCULATED.3IONS-SCNC: 12.7 MMOL/L (ref 5–15)
AST SERPL-CCNC: 15 U/L (ref 1–32)
BASOPHILS # BLD AUTO: 0.07 10*3/MM3 (ref 0–0.2)
BASOPHILS NFR BLD AUTO: 0.9 % (ref 0–1.5)
BILIRUB SERPL-MCNC: 0.3 MG/DL (ref 0–1.2)
BUN SERPL-MCNC: 11 MG/DL (ref 6–20)
BUN/CREAT SERPL: 15.3 (ref 7–25)
CALCIUM SPEC-SCNC: 9.3 MG/DL (ref 8.6–10.5)
CHLORIDE SERPL-SCNC: 104 MMOL/L (ref 98–107)
CHOLEST SERPL-MCNC: 216 MG/DL (ref 0–200)
CO2 SERPL-SCNC: 26.3 MMOL/L (ref 22–29)
CREAT SERPL-MCNC: 0.72 MG/DL (ref 0.57–1)
DEPRECATED RDW RBC AUTO: 39.4 FL (ref 37–54)
EGFRCR SERPLBLD CKD-EPI 2021: 100.7 ML/MIN/1.73
EOSINOPHIL # BLD AUTO: 0.16 10*3/MM3 (ref 0–0.4)
EOSINOPHIL NFR BLD AUTO: 2 % (ref 0.3–6.2)
ERYTHROCYTE [DISTWIDTH] IN BLOOD BY AUTOMATED COUNT: 13.1 % (ref 12.3–15.4)
GLOBULIN UR ELPH-MCNC: 3 GM/DL
GLUCOSE SERPL-MCNC: 106 MG/DL (ref 65–99)
HBA1C MFR BLD: 5.9 % (ref 4.8–5.6)
HCT VFR BLD AUTO: 41.5 % (ref 34–46.6)
HDLC SERPL-MCNC: 74 MG/DL (ref 40–60)
HGB BLD-MCNC: 13.9 G/DL (ref 12–15.9)
IMM GRANULOCYTES # BLD AUTO: 0.03 10*3/MM3 (ref 0–0.05)
IMM GRANULOCYTES NFR BLD AUTO: 0.4 % (ref 0–0.5)
LDLC SERPL CALC-MCNC: 130 MG/DL (ref 0–100)
LDLC/HDLC SERPL: 1.74 {RATIO}
LYMPHOCYTES # BLD AUTO: 2.56 10*3/MM3 (ref 0.7–3.1)
LYMPHOCYTES NFR BLD AUTO: 31.3 % (ref 19.6–45.3)
MCH RBC QN AUTO: 28 PG (ref 26.6–33)
MCHC RBC AUTO-ENTMCNC: 33.5 G/DL (ref 31.5–35.7)
MCV RBC AUTO: 83.5 FL (ref 79–97)
MONOCYTES # BLD AUTO: 0.52 10*3/MM3 (ref 0.1–0.9)
MONOCYTES NFR BLD AUTO: 6.3 % (ref 5–12)
NEUTROPHILS NFR BLD AUTO: 4.85 10*3/MM3 (ref 1.7–7)
NEUTROPHILS NFR BLD AUTO: 59.1 % (ref 42.7–76)
NRBC BLD AUTO-RTO: 0 /100 WBC (ref 0–0.2)
PLATELET # BLD AUTO: 310 10*3/MM3 (ref 140–450)
PMV BLD AUTO: 10.1 FL (ref 6–12)
POTASSIUM SERPL-SCNC: 4.2 MMOL/L (ref 3.5–5.2)
PROT SERPL-MCNC: 7.2 G/DL (ref 6–8.5)
RBC # BLD AUTO: 4.97 10*6/MM3 (ref 3.77–5.28)
SODIUM SERPL-SCNC: 143 MMOL/L (ref 136–145)
TRIGL SERPL-MCNC: 68 MG/DL (ref 0–150)
TSH SERPL DL<=0.05 MIU/L-ACNC: 1.65 UIU/ML (ref 0.27–4.2)
VIT B12 BLD-MCNC: 234 PG/ML (ref 211–946)
VLDLC SERPL-MCNC: 12 MG/DL (ref 5–40)
WBC NRBC COR # BLD: 8.19 10*3/MM3 (ref 3.4–10.8)

## 2023-05-01 PROCEDURE — 82607 VITAMIN B-12: CPT

## 2023-05-01 PROCEDURE — 84443 ASSAY THYROID STIM HORMONE: CPT

## 2023-05-01 PROCEDURE — 83036 HEMOGLOBIN GLYCOSYLATED A1C: CPT

## 2023-05-01 PROCEDURE — 82306 VITAMIN D 25 HYDROXY: CPT

## 2023-05-01 PROCEDURE — 80053 COMPREHEN METABOLIC PANEL: CPT

## 2023-05-01 PROCEDURE — 80061 LIPID PANEL: CPT

## 2023-05-01 PROCEDURE — 85025 COMPLETE CBC W/AUTO DIFF WBC: CPT

## 2023-05-01 RX ORDER — LISINOPRIL 40 MG/1
40 TABLET ORAL DAILY
Qty: 90 TABLET | Refills: 3 | Status: SHIPPED | OUTPATIENT
Start: 2023-05-01

## 2023-05-01 NOTE — ASSESSMENT & PLAN NOTE
Annual wellness exam completed today. Health Maintenance including immunizations was updated and reflected in the chart. Yearly screening labs were ordered.     Further recommendations to be given once lab data received.     Health advice: healthy food choices with fresh fruits and vegetables, maintain sleep pattern at least 8 hours, avoid texting and distracted driving practices; wear safety belt, engage in regular exercise, maintain healthy weight, use safe sex practices, avoid alcohol and illicit drugs. Maintain immunizations that are up to date. Maintain health maintenance: Colonoscopy, Mammo, PAP, etc.  Follow up with PCP if struggling with depression or anxiety. Keep regular dental and eye exams. Brush and floss teeth daily.     F/U annually and prn.

## 2023-05-01 NOTE — PROGRESS NOTES
Subjective     Chief Complaint  Annual Exam    Subjective          Sharee Cheek is a 53 y.o. female who presents today to [facility] for annual exam .    HPI:   History of Present Illness     Ms. Cheek is a pleasant 53 year old female who presents today to follow up on chronic conditions and annual physical exam.   Her past medical history includes hypertension, prediabetes, hypothyroidism, insomnia and migraines.  She is due for baseline labs including vitamin levels as she has had vitamin D and B12 deficiency in the past.  She is aslo due for her mammogram.      The following portions of the patient's history were reviewed and updated as appropriate: allergies, current medications, past family history, past medical history, past social history, past surgical history and problem list.    Objective     Objective     Allergy:   Allergies   Allergen Reactions   • Ibuprofen Anaphylaxis   • Acetaminophen Other (See Comments)     Atypical sedation     • Keflex [Cephalexin]    • Penicillins         Current Medications:   Current Outpatient Medications   Medication Sig Dispense Refill   • lisinopril (PRINIVIL,ZESTRIL) 40 MG tablet Take 1 tablet by mouth Daily. 90 tablet 3   • rizatriptan MLT (MAXALT-MLT) 10 MG disintegrating tablet 1 tablet As Needed.     • Synthroid 100 MCG tablet Take 1 tablet by mouth Daily. NEEDS APPT FOR MORE REFILLS 30 tablet 5   • traZODone (DESYREL) 50 MG tablet Take 1 tablet by mouth Daily.       No current facility-administered medications for this visit.       Past Medical History:  Past Medical History:   Diagnosis Date   • History of conjunctivitis    • History of influenza    • History of sinusitis    • Hypertension    • Insomnia        Past Surgical History:  Past Surgical History:   Procedure Laterality Date   • DIAGNOSTIC LAPAROSCOPY EXPLORATORY LAPAROTOMY     • HYSTEROSCOPY ENDOMETRIAL ABLATION     • LAPAROSCOPIC CHOLECYSTECTOMY     •  "TONSILLECTOMY AND ADENOIDECTOMY         Social History:  Social History     Socioeconomic History   • Marital status:    Tobacco Use   • Smoking status: Never     Passive exposure: Never   • Smokeless tobacco: Never   Vaping Use   • Vaping Use: Never used   Substance and Sexual Activity   • Alcohol use: No   • Drug use: No   • Sexual activity: Defer       Family History:  Family History   Problem Relation Age of Onset   • COPD Other    • Breast cancer Mother    • Breast cancer Maternal Grandmother    • Ovarian cancer Maternal Grandmother    • Diabetes Maternal Grandmother    • Polycystic ovary syndrome Maternal Grandmother          Vital Signs:   /80   Pulse 70   Temp 97.7 °F (36.5 °C)   Resp 16   Ht 163.8 cm (64.5\")   Wt 106 kg (234 lb 6.4 oz)   SpO2 99%   BMI 39.61 kg/m²      Physical Exam:  Physical Exam  Constitutional:       Appearance: She is not ill-appearing.   Eyes:      Pupils: Pupils are equal, round, and reactive to light.   Cardiovascular:      Rate and Rhythm: Normal rate.      Pulses: Normal pulses.   Pulmonary:      Effort: Pulmonary effort is normal.      Breath sounds: Normal breath sounds.   Neurological:      General: No focal deficit present.      Mental Status: She is alert. Mental status is at baseline.   Psychiatric:         Mood and Affect: Mood normal.         Behavior: Behavior normal.         Thought Content: Thought content normal.         Judgment: Judgment normal.               PHQ-9 Score  PHQ-9 Total Score: 0     Lab Review  No visits with results within 3 Month(s) from this visit.   Latest known visit with results is:   Office Visit on 01/03/2023   Component Date Value Ref Range Status   • Monospot 01/03/2023 Negative  Negative Final   • Internal Control 01/03/2023 Passed  Passed Final   • Lot Number 01/03/2023 [id number]   Final   • Expiration Date 01/03/2023 03-   Final        Radiology Results        Assessment / Plan         Assessment and Plan   Diagnoses " and all orders for this visit:    1. Encounter for annual physical exam (Primary)  Assessment & Plan:  Annual wellness exam completed today. Health Maintenance including immunizations was updated and reflected in the chart. Yearly screening labs were ordered.     Further recommendations to be given once lab data received.     Health advice: healthy food choices with fresh fruits and vegetables, maintain sleep pattern at least 8 hours, avoid texting and distracted driving practices; wear safety belt, engage in regular exercise, maintain healthy weight, use safe sex practices, avoid alcohol and illicit drugs. Maintain immunizations that are up to date. Maintain health maintenance: Colonoscopy, Mammo, PAP, etc.  Follow up with PCP if struggling with depression or anxiety. Keep regular dental and eye exams. Brush and floss teeth daily.     F/U annually and prn.       Orders:  -     Comprehensive Metabolic Panel; Future  -     CBC & Differential; Future  -     Hemoglobin A1c; Future  -     Lipid Panel; Future  -     TSH Rfx On Abnormal To Free T4; Future  -     Vitamin B12; Future  -     Vitamin D,25-Hydroxy; Future    2. Primary hypertension  Assessment & Plan:  Hypertension is stable.  Continue current treatment regimen.  Blood pressure will be reassessed at the next regular appointment.    Orders:  -     lisinopril (PRINIVIL,ZESTRIL) 40 MG tablet; Take 1 tablet by mouth Daily.  Dispense: 90 tablet; Refill: 3    3. Hypothyroidism due to Hashimoto's thyroiditis  Assessment & Plan:  - continue replacement therapy - TSH ordered       4. Prediabetes  Assessment & Plan:  HgA1c ordered with labs     Orders:  -     Hemoglobin A1c; Future    5. Insomnia, unspecified type  Assessment & Plan:  Continue PRN Trazodone       6. Encounter for screening mammogram for malignant neoplasm of breast  -     Mammo Screening Digital Tomosynthesis Bilateral With CAD; Future      Discussed possible differential diagnoses, testing, treatment,  recommended non-pharmacological interventions, risks, warning signs to monitor for that would indicate need for follow-up in clinic or ER. If no improvement with these regimens or you have new or worsening symptoms follow-up. Patient verbalizes understanding and agreement with plan of care. Denies further needs or concerns.     Patient was given instructions and counseling regarding her condition and for health maintenance advised.    Class 2 Severe Obesity (BMI >=35 and <=39.9). Obesity-related health conditions include the following: hypertension. Obesity is unchanged. BMI is is above average; BMI management plan is completed. We discussed low calorie, low carb based diet program, portion control and increasing exercise.           Health Maintenance  Health Maintenance:   Health Maintenance Due   Topic Date Due   • ZOSTER VACCINE (1 of 2) Never done   • COVID-19 Vaccine (4 - Booster for Moderna series) 03/12/2022        Meds ordered during this visit  New Medications Ordered This Visit   Medications   • lisinopril (PRINIVIL,ZESTRIL) 40 MG tablet     Sig: Take 1 tablet by mouth Daily.     Dispense:  90 tablet     Refill:  3       Meds stopped during this visit:  Medications Discontinued During This Encounter   Medication Reason   • albuterol sulfate  (90 Base) MCG/ACT inhaler *Therapy completed   • lisinopril (PRINIVIL,ZESTRIL) 40 MG tablet Reorder        Visit Diagnoses    ICD-10-CM ICD-9-CM   1. Encounter for annual physical exam  Z00.00 V70.0   2. Primary hypertension  I10 401.9   3. Hypothyroidism due to Hashimoto's thyroiditis  E03.8 244.8    E06.3 245.2   4. Prediabetes  R73.03 790.29   5. Insomnia, unspecified type  G47.00 780.52   6. Encounter for screening mammogram for malignant neoplasm of breast  Z12.31 V76.12       Patient was given instructions and counseling regarding her condition or for health maintenance advice. Please see specific information pulled into the AVS if appropriate.     Follow  Up   Return in about 1 year (around 5/1/2024) for Annual.      This document has been electronically signed by Sveta Cheng DO   May 1, 2023 14:54 EDT    Dictated Utilizing Dragon Dictation: Part of this note may be an electronic transcription/translation of spoken language to printed text using the Dragon Dictation System.    Sveta Cheng D.O.  [facility] [place]

## 2023-06-01 RX ORDER — LEVOTHYROXINE SODIUM 100 MCG
TABLET ORAL
Qty: 90 TABLET | Refills: 1 | Status: SHIPPED | OUTPATIENT
Start: 2023-06-01

## 2023-10-24 ENCOUNTER — OFFICE VISIT (OUTPATIENT)
Dept: FAMILY MEDICINE CLINIC | Facility: CLINIC | Age: 53
End: 2023-10-24
Payer: COMMERCIAL

## 2023-10-24 VITALS
WEIGHT: 236.4 LBS | SYSTOLIC BLOOD PRESSURE: 138 MMHG | OXYGEN SATURATION: 96 % | TEMPERATURE: 98.6 F | HEART RATE: 81 BPM | DIASTOLIC BLOOD PRESSURE: 78 MMHG | HEIGHT: 64 IN | BODY MASS INDEX: 40.36 KG/M2

## 2023-10-24 DIAGNOSIS — K21.9 GASTROESOPHAGEAL REFLUX DISEASE WITHOUT ESOPHAGITIS: Primary | ICD-10-CM

## 2023-10-24 DIAGNOSIS — R10.13 EPIGASTRIC PAIN: ICD-10-CM

## 2023-10-24 DIAGNOSIS — R11.0 NAUSEA: ICD-10-CM

## 2023-10-24 DIAGNOSIS — R13.19 ESOPHAGEAL DYSPHAGIA: ICD-10-CM

## 2023-10-24 PROCEDURE — 99213 OFFICE O/P EST LOW 20 MIN: CPT | Performed by: NURSE PRACTITIONER

## 2023-10-24 RX ORDER — OMEPRAZOLE 20 MG/1
20 CAPSULE, DELAYED RELEASE ORAL DAILY
COMMUNITY
End: 2023-10-24 | Stop reason: SDUPTHER

## 2023-10-24 RX ORDER — ONDANSETRON 4 MG/1
4 TABLET, ORALLY DISINTEGRATING ORAL EVERY 8 HOURS PRN
Qty: 20 TABLET | Refills: 1 | Status: SHIPPED | OUTPATIENT
Start: 2023-10-24

## 2023-10-24 RX ORDER — OMEPRAZOLE 40 MG/1
40 CAPSULE, DELAYED RELEASE ORAL DAILY
Qty: 90 CAPSULE | Refills: 1 | Status: SHIPPED | OUTPATIENT
Start: 2023-10-24

## 2023-10-24 RX ORDER — ESTRADIOL/NORETHINDRONE ACETATE TRANSDERMAL SYSTEM .05; .14 MG/D; MG/D
PATCH, EXTENDED RELEASE TRANSDERMAL
COMMUNITY

## 2023-10-24 NOTE — PROGRESS NOTES
"Chief Complaint  Abdominal Pain (STOMACH PAIN IN UPPER PART, NAUSEA THAT STARTED TWO WEEKS AGO, GASTRIC REFLUX BURNING IN STOMACH AND CHEST BUT DOESN'T COME ALL THE WAY OUT. AS SOON AS SHE EATS AND THE FOOD REACHES THE STOMACH IT BURNS AND HURTS, OVER THE COUNTER MEDICATIONS DO NOT HELP)    Subjective          Sharee Cheek presents to [facility]  History of Present Illness  Patient is a 53-year-old female.  She is here for complaint of abdominal pain, upper abdomen, epigastric area.  She does report she has GERD with frequent heartburn.  Burning after eating.  She reports nausea, denies any vomiting.     She has been taking omeprazole 20 mg in the evening.  Occasional Tums.    Has a history of cholecystectomy in 1999.  Reports colonoscopy in 1/22 2 polyps removed. Negative for cancer. Has internal hemorrhoids.   Due to repeat in 5 years. She reports \" feeling things get stuck in my throat such as with pills, and I do drink while eating to get things to go down. \"   She denies any alcohol usage.     She denies any black tarry, burgandy or bright red rectal bleeding. She states her symptoms started about 6 months ago and have worsened over the past 2 months. She denies any hx of H-pyloir.   Denies any constipation or diarrhea. She currently avoids spicy foods, or really acidic such as orange juice. Increase water intake.       Discussed increasing her omeprazole to 40 mg daily. Take in AM and wait 30 minutes prior to eating. Take TUMS or other OTC medication such as maalox or gaviscon.   Will check labs.   Follow up with PCP in 2 weeks. Referral to GI             Abdominal Pain  This is a recurrent problem. The current episode started more than 1 month ago. The pain is located in the epigastric region. Associated symptoms include nausea.       The following portions of the patient's history were reviewed and updated as appropriate: allergies, current medications, past family " "history, past medical history, past social history, past surgical history and problem list.    Review of Systems   Constitutional: Negative.    HENT: Negative.     Respiratory: Negative.     Gastrointestinal:  Positive for abdominal pain and nausea.        Heartburn    Genitourinary: Negative.    Musculoskeletal: Negative.    Allergic/Immunologic: Negative.    Neurological: Negative.          Objective   Vital Signs:   /78   Pulse 81   Temp 98.6 °F (37 °C) (Infrared)   Ht 163.8 cm (64.49\")   Wt 107 kg (236 lb 6.4 oz)   SpO2 96%   BMI 39.97 kg/m²           PHQ-2/9 Depression Screening  PHQ-9 Total Score:      DOMENICA-7 Anxiety Screening  DOMENICA-7             Physical Exam  Vitals reviewed.   Constitutional:       Appearance: She is well-developed. She is not ill-appearing.   HENT:      Head: Normocephalic.      Right Ear: Tympanic membrane, ear canal and external ear normal.      Left Ear: Tympanic membrane, ear canal and external ear normal.      Nose: Nose normal.      Mouth/Throat:      Mouth: Mucous membranes are moist.   Eyes:      Conjunctiva/sclera: Conjunctivae normal.      Pupils: Pupils are equal, round, and reactive to light.   Cardiovascular:      Rate and Rhythm: Normal rate and regular rhythm.      Heart sounds: Normal heart sounds.   Pulmonary:      Effort: Pulmonary effort is normal.      Breath sounds: Normal breath sounds.   Abdominal:      General: Bowel sounds are normal.      Palpations: Abdomen is soft.      Tenderness: There is abdominal tenderness in the epigastric area.   Musculoskeletal:         General: Normal range of motion.      Cervical back: Normal range of motion.   Lymphadenopathy:      Cervical: No cervical adenopathy.   Skin:     General: Skin is warm and dry.      Capillary Refill: Capillary refill takes less than 2 seconds.   Neurological:      Mental Status: She is alert and oriented to person, place, and time.   Psychiatric:         Mood and Affect: Mood normal.         " Speech: Speech normal.         Behavior: Behavior normal. Behavior is cooperative.         Thought Content: Thought content normal.         Judgment: Judgment normal.        Result Review :            SCANNED - COLONOSCOPY (01/31/2022)     Comprehensive Metabolic Panel (05/01/2023 08:33)  CBC & Differential (05/01/2023 08:33)           Assessment and Plan    Diagnoses and all orders for this visit:    1. Gastroesophageal reflux disease without esophagitis (Primary)  -     omeprazole (priLOSEC) 40 MG capsule; Take 1 capsule by mouth Daily.  Dispense: 90 capsule; Refill: 1  -     H. Pylori Breath Test - , Lung; Future  -     CBC & Differential; Future  -     Comprehensive Metabolic Panel; Future  -     Amylase; Future  -     Lipase    2. Esophageal dysphagia  -     omeprazole (priLOSEC) 40 MG capsule; Take 1 capsule by mouth Daily.  Dispense: 90 capsule; Refill: 1  -     Ambulatory Referral to Gastroenterology  -     CBC & Differential; Future  -     Comprehensive Metabolic Panel; Future  -     Amylase; Future  -     Lipase    3. Nausea  -     H. Pylori Breath Test - , Lung; Future  -     ondansetron ODT (ZOFRAN-ODT) 4 MG disintegrating tablet; Place 1 tablet on the tongue Every 8 (Eight) Hours As Needed for Nausea or Vomiting.  Dispense: 20 tablet; Refill: 1  -     CBC & Differential; Future  -     Comprehensive Metabolic Panel; Future  -     Amylase; Future  -     Lipase    4. Epigastric pain  -     CBC & Differential; Future  -     Comprehensive Metabolic Panel; Future  -     Amylase; Future  -     Lipase    Referral to GI   Check labs   Omeprazole increased to 40 mg.   TUMS or other antacid OTC PRN.   Increase water intake.   Continue to avoid spicy foods, caffeine, acidic foods,   Avoid laying down within two hours of eating.       Follow up with PCP       Follow Up   Return in about 4 weeks (around 11/21/2023), or if symptoms worsen or fail to improve.  Patient was given instructions and counseling regarding her  condition or for health maintenance advice. Please see specific information pulled into the AVS if appropriate.

## 2023-10-27 ENCOUNTER — LAB (OUTPATIENT)
Dept: LAB | Facility: HOSPITAL | Age: 53
End: 2023-10-27
Payer: COMMERCIAL

## 2023-10-27 DIAGNOSIS — K21.9 GASTROESOPHAGEAL REFLUX DISEASE WITHOUT ESOPHAGITIS: ICD-10-CM

## 2023-10-27 DIAGNOSIS — R11.0 NAUSEA: ICD-10-CM

## 2023-10-27 DIAGNOSIS — R13.19 ESOPHAGEAL DYSPHAGIA: ICD-10-CM

## 2023-10-27 DIAGNOSIS — R10.13 EPIGASTRIC PAIN: ICD-10-CM

## 2023-10-27 LAB
ALBUMIN SERPL-MCNC: 4.3 G/DL (ref 3.5–5.2)
ALBUMIN/GLOB SERPL: 1.4 G/DL
ALP SERPL-CCNC: 88 U/L (ref 39–117)
ALT SERPL W P-5'-P-CCNC: 10 U/L (ref 1–33)
AMYLASE SERPL-CCNC: 40 U/L (ref 28–100)
ANION GAP SERPL CALCULATED.3IONS-SCNC: 8 MMOL/L (ref 5–15)
AST SERPL-CCNC: 12 U/L (ref 1–32)
BASOPHILS # BLD AUTO: 0.06 10*3/MM3 (ref 0–0.2)
BASOPHILS NFR BLD AUTO: 0.8 % (ref 0–1.5)
BILIRUB SERPL-MCNC: 0.3 MG/DL (ref 0–1.2)
BUN SERPL-MCNC: 9 MG/DL (ref 6–20)
BUN/CREAT SERPL: 11.4 (ref 7–25)
CALCIUM SPEC-SCNC: 9.7 MG/DL (ref 8.6–10.5)
CHLORIDE SERPL-SCNC: 104 MMOL/L (ref 98–107)
CO2 SERPL-SCNC: 29 MMOL/L (ref 22–29)
CREAT SERPL-MCNC: 0.79 MG/DL (ref 0.57–1)
DEPRECATED RDW RBC AUTO: 39.7 FL (ref 37–54)
EGFRCR SERPLBLD CKD-EPI 2021: 90.1 ML/MIN/1.73
EOSINOPHIL # BLD AUTO: 0.17 10*3/MM3 (ref 0–0.4)
EOSINOPHIL NFR BLD AUTO: 2.1 % (ref 0.3–6.2)
ERYTHROCYTE [DISTWIDTH] IN BLOOD BY AUTOMATED COUNT: 13.1 % (ref 12.3–15.4)
GLOBULIN UR ELPH-MCNC: 3.1 GM/DL
GLUCOSE SERPL-MCNC: 128 MG/DL (ref 65–99)
HCT VFR BLD AUTO: 41.7 % (ref 34–46.6)
HGB BLD-MCNC: 13.8 G/DL (ref 12–15.9)
IMM GRANULOCYTES # BLD AUTO: 0.02 10*3/MM3 (ref 0–0.05)
IMM GRANULOCYTES NFR BLD AUTO: 0.3 % (ref 0–0.5)
LIPASE SERPL-CCNC: 20 U/L (ref 13–60)
LYMPHOCYTES # BLD AUTO: 2.35 10*3/MM3 (ref 0.7–3.1)
LYMPHOCYTES NFR BLD AUTO: 29.5 % (ref 19.6–45.3)
MCH RBC QN AUTO: 27.7 PG (ref 26.6–33)
MCHC RBC AUTO-ENTMCNC: 33.1 G/DL (ref 31.5–35.7)
MCV RBC AUTO: 83.6 FL (ref 79–97)
MONOCYTES # BLD AUTO: 0.49 10*3/MM3 (ref 0.1–0.9)
MONOCYTES NFR BLD AUTO: 6.1 % (ref 5–12)
NEUTROPHILS NFR BLD AUTO: 4.88 10*3/MM3 (ref 1.7–7)
NEUTROPHILS NFR BLD AUTO: 61.2 % (ref 42.7–76)
NRBC BLD AUTO-RTO: 0 /100 WBC (ref 0–0.2)
PLATELET # BLD AUTO: 347 10*3/MM3 (ref 140–450)
PMV BLD AUTO: 10 FL (ref 6–12)
POTASSIUM SERPL-SCNC: 4.5 MMOL/L (ref 3.5–5.2)
PROT SERPL-MCNC: 7.4 G/DL (ref 6–8.5)
RBC # BLD AUTO: 4.99 10*6/MM3 (ref 3.77–5.28)
SODIUM SERPL-SCNC: 141 MMOL/L (ref 136–145)
WBC NRBC COR # BLD: 7.97 10*3/MM3 (ref 3.4–10.8)

## 2023-10-27 PROCEDURE — 80053 COMPREHEN METABOLIC PANEL: CPT

## 2023-10-27 PROCEDURE — 83690 ASSAY OF LIPASE: CPT | Performed by: NURSE PRACTITIONER

## 2023-10-27 PROCEDURE — 83013 H PYLORI (C-13) BREATH: CPT

## 2023-10-27 PROCEDURE — 36415 COLL VENOUS BLD VENIPUNCTURE: CPT

## 2023-10-27 PROCEDURE — 85025 COMPLETE CBC W/AUTO DIFF WBC: CPT

## 2023-10-27 PROCEDURE — 82150 ASSAY OF AMYLASE: CPT

## 2023-10-29 LAB — UREA BREATH TEST QL: NEGATIVE

## 2023-10-29 NOTE — PROGRESS NOTES
Please call patient regarding her abnormal labs. Her glucose level is mildly elevated.   Otherwise, her liver function, kidney function, amylase are normal.   She is negative for Helicobacter pylori.

## 2023-11-27 NOTE — TELEPHONE ENCOUNTER
Rx Refill Note  Requested Prescriptions     Pending Prescriptions Disp Refills    Synthroid 100 MCG tablet [Pharmacy Med Name: SYNTHROID 100 MCG TABLET] 90 tablet 1     Sig: TAKE 1 TABLET BY MOUTH EVERY DAY      Last office visit with prescribing clinician: Visit date not found   Last telemedicine visit with prescribing clinician: Visit date not found   Next office visit with prescribing clinician: Visit date not found                         Would you like a call back once the refill request has been completed: [] Yes [] No    If the office needs to give you a call back, can they leave a voicemail: [] Yes [] No    Dalia Zimmer MA  11/27/23, 08:09 EST

## 2023-11-28 RX ORDER — LEVOTHYROXINE SODIUM 100 MCG
100 TABLET ORAL DAILY
Qty: 90 TABLET | Refills: 1 | Status: SHIPPED | OUTPATIENT
Start: 2023-11-28

## 2023-12-26 ENCOUNTER — TELEMEDICINE (OUTPATIENT)
Dept: FAMILY MEDICINE CLINIC | Facility: TELEHEALTH | Age: 53
End: 2023-12-26
Payer: COMMERCIAL

## 2023-12-26 DIAGNOSIS — J01.00 ACUTE MAXILLARY SINUSITIS, RECURRENCE NOT SPECIFIED: Primary | ICD-10-CM

## 2023-12-26 DIAGNOSIS — J40 BRONCHITIS: ICD-10-CM

## 2023-12-26 RX ORDER — ALBUTEROL SULFATE 90 UG/1
2 AEROSOL, METERED RESPIRATORY (INHALATION) EVERY 4 HOURS PRN
Qty: 18 G | Refills: 0 | Status: SHIPPED | OUTPATIENT
Start: 2023-12-26

## 2023-12-26 RX ORDER — PREDNISONE 10 MG/1
TABLET ORAL
Qty: 21 TABLET | Refills: 0 | Status: SHIPPED | OUTPATIENT
Start: 2023-12-26

## 2023-12-26 RX ORDER — DOXYCYCLINE 100 MG/1
100 CAPSULE ORAL 2 TIMES DAILY
Qty: 20 CAPSULE | Refills: 0 | Status: SHIPPED | OUTPATIENT
Start: 2023-12-26 | End: 2024-01-05

## 2023-12-26 NOTE — PROGRESS NOTES
You have chosen to receive care through a telehealth visit.  Do you consent to use a video/audio connection for your medical care today? Yes     CHIEF COMPLAINT  No chief complaint on file.        HPI  Sharee Cheek is a 53 y.o. female  presents with complaint of several days history of nasal congestion with yellow/bloody discharge, facial pressure/tenderness on left side, PND, laryngitis, persistent cough, burning in chest, wheezing.  Denies fever, shortness of breath.     Negative for COVID x 2; negative for flu and strep    Review of Systems  See HPI    Past Medical History:   Diagnosis Date    History of conjunctivitis     History of influenza     History of sinusitis     Hypertension     Insomnia        Family History   Problem Relation Age of Onset    COPD Other     Breast cancer Mother     Breast cancer Maternal Grandmother     Ovarian cancer Maternal Grandmother     Diabetes Maternal Grandmother     Polycystic ovary syndrome Maternal Grandmother        Social History     Socioeconomic History    Marital status:    Tobacco Use    Smoking status: Never     Passive exposure: Never    Smokeless tobacco: Never   Vaping Use    Vaping Use: Never used   Substance and Sexual Activity    Alcohol use: No    Drug use: No    Sexual activity: Not Currently       Sharee Cheek  reports that she has never smoked. She has never been exposed to tobacco smoke. She has never used smokeless tobacco..              LMP 11/11/2019 (Exact Date)     PHYSICAL EXAM  Physical Exam   Constitutional: She is oriented to person, place, and time. She appears well-developed and well-nourished. She does not have a sickly appearance. She appears ill.   Raspy voice   HENT:   Head: Normocephalic and atraumatic.   Pulmonary/Chest: Effort normal.  No respiratory distress.  Neurological: She is alert and oriented to person, place, and time.           Diagnoses and all orders for this visit:    1. Acute maxillary sinusitis, recurrence  not specified (Primary)  -     doxycycline (MONODOX) 100 MG capsule; Take 1 capsule by mouth 2 (Two) Times a Day for 10 days.  Dispense: 20 capsule; Refill: 0    2. Bronchitis  -     predniSONE (DELTASONE) 10 MG (21) dose pack; Use as directed on package  Dispense: 21 tablet; Refill: 0  -     albuterol sulfate  (90 Base) MCG/ACT inhaler; Inhale 2 puffs Every 4 (Four) Hours As Needed for Wheezing or Shortness of Air.  Dispense: 18 g; Refill: 0    --take medications as prescribed  --increase fluids, rest as needed, tylenol or ibuprofen for pain  --f/u in 5-7 days if no improvement        FOLLOW-UP  As discussed during visit with PCP/[facility] if no improvement or Urgent Care/Emergency Department if worsening of symptoms    Patient verbalizes understanding of medication dosage, comfort measures, instructions for treatment and follow-up.    America Conklin, APRN  12/26/2023  14:13 EST    The use of a video visit has been reviewed with the patient and verbal informed consent has been obtained. Myself and Sharee Cheek participated in this visit. The patient is located in [address] [place] [address].    I am located in [place], [place]. [employer] and [employer] were utilized. I spent 8 minutes in the patient's chart for this visit.

## 2023-12-26 NOTE — PATIENT INSTRUCTIONS
Sinus Infection, Adult  A sinus infection, also called sinusitis, is inflammation of your sinuses. Sinuses are hollow spaces in the bones around your face. Your sinuses are located:  Around your eyes.  In the middle of your forehead.  Behind your nose.  In your cheekbones.  Mucus normally drains out of your sinuses. When your nasal tissues become inflamed or swollen, mucus can become trapped or blocked. This allows bacteria, viruses, and fungi to grow, which leads to infection. Most infections of the sinuses are caused by a virus.  A sinus infection can develop quickly. It can last for up to 4 weeks (acute) or for more than 12 weeks (chronic). A sinus infection often develops after a cold.  What are the causes?  This condition is caused by anything that creates swelling in the sinuses or stops mucus from draining. This includes:  Allergies.  Asthma.  Infection from bacteria or viruses.  Deformities or blockages in your nose or sinuses.  Abnormal growths in the nose (nasal polyps).  Pollutants, such as chemicals or irritants in the air.  Infection from fungi. This is rare.  What increases the risk?  You are more likely to develop this condition if you:  Have a weak body defense system (immune system).  Do a lot of swimming or diving.  Overuse nasal sprays.  Smoke.  What are the signs or symptoms?  The main symptoms of this condition are pain and a feeling of pressure around the affected sinuses. Other symptoms include:  Stuffy nose or congestion that makes it difficult to breathe through your nose.  Thick yellow or greenish drainage from your nose.  Tenderness, swelling, and warmth over the affected sinuses.  A cough that may get worse at night.  Decreased sense of smell and taste.  Extra mucus that collects in the throat or the back of the nose (postnasal drip) causing a sore throat or bad breath.  Tiredness (fatigue).  Fever.  How is this diagnosed?  This condition is diagnosed based on:  Your symptoms.  Your  medical history.  A physical exam.  Tests to find out if your condition is acute or chronic. This may include:  Checking your nose for nasal polyps.  Viewing your sinuses using a device that has a light (endoscope).  Testing for allergies or bacteria.  Imaging tests, such as an MRI or CT scan.  In rare cases, a bone biopsy may be done to rule out more serious types of fungal sinus disease.  How is this treated?  Treatment for a sinus infection depends on the cause and whether your condition is chronic or acute.  If caused by a virus, your symptoms should go away on their own within 10 days. You may be given medicines to relieve symptoms. They include:  Medicines that shrink swollen nasal passages (decongestants).  A spray that eases inflammation of the nostrils (topical intranasal corticosteroids).  Rinses that help get rid of thick mucus in your nose (nasal saline washes).  Medicines that treat allergies (antihistamines).  Over-the-counter pain relievers.  If caused by bacteria, your health care provider may recommend waiting to see if your symptoms improve. Most bacterial infections will get better without antibiotic medicine. You may be given antibiotics if you have:  A severe infection.  A weak immune system.  If caused by narrow nasal passages or nasal polyps, surgery may be needed.  Follow these instructions at home:  Medicines  Take, use, or apply over-the-counter and prescription medicines only as told by your health care provider. These may include nasal sprays.  If you were prescribed an antibiotic medicine, take it as told by your health care provider. Do not stop taking the antibiotic even if you start to feel better.  Hydrate and humidify    Drink enough fluid to keep your urine pale yellow. Staying hydrated will help to thin your mucus.  Use a cool mist humidifier to keep the humidity level in your home above 50%.  Inhale steam for 10-15 minutes, 3-4 times a day, or as told by your health care  provider. You can do this in the bathroom while a hot shower is running.  Limit your exposure to cool or dry air.  Rest  Rest as much as possible.  Sleep with your head raised (elevated).  Make sure you get enough sleep each night.  General instructions    Apply a warm, moist washcloth to your face 3-4 times a day or as told by your health care provider. This will help with discomfort.  Use nasal saline washes as often as told by your health care provider.  Wash your hands often with soap and water to reduce your exposure to germs. If soap and water are not available, use hand .  Do not smoke. Avoid being around people who are smoking (secondhand smoke).  Keep all follow-up visits. This is important.  Contact a health care provider if:  You have a fever.  Your symptoms get worse.  Your symptoms do not improve within 10 days.  Get help right away if:  You have a severe headache.  You have persistent vomiting.  You have severe pain or swelling around your face or eyes.  You have vision problems.  You develop confusion.  Your neck is stiff.  You have trouble breathing.  These symptoms may be an emergency. Get help right away. Call 911.  Do not wait to see if the symptoms will go away.  Do not drive yourself to the hospital.  Summary  A sinus infection is soreness and inflammation of your sinuses. Sinuses are hollow spaces in the bones around your face.  This condition is caused by nasal tissues that become inflamed or swollen. The swelling traps or blocks the flow of mucus. This allows bacteria, viruses, and fungi to grow, which leads to infection.  If you were prescribed an antibiotic medicine, take it as told by your health care provider. Do not stop taking the antibiotic even if you start to feel better.  Keep all follow-up visits. This is important.  This information is not intended to replace advice given to you by your health care provider. Make sure you discuss any questions you have with your health  care provider.  Document Revised: 11/22/2022 Document Reviewed: 11/22/2022  Elsevier Patient Education © 2023 [employer]  Acute Bronchitis, Adult    Acute bronchitis is sudden inflammation of the main airways (bronchi) that come off the windpipe (trachea) in the lungs. The swelling causes the airways to get smaller and make more mucus than normal. This can make it hard to breathe and can cause coughing or noisy breathing (wheezing).  Acute bronchitis may last several weeks. The cough may last longer. Allergies, asthma, and exposure to smoke may make the condition worse.  What are the causes?  This condition can be caused by germs and by substances that irritate the lungs, including:  Cold and flu viruses. The most common cause of this condition is the virus that causes the common cold.  Bacteria. This is less common.  Breathing in substances that irritate the lungs, including:  Smoke from cigarettes and other forms of tobacco.  Dust and pollen.  Fumes from household cleaning products, gases, or burned fuel.  Indoor or outdoor air pollution.  What increases the risk?  The following factors may make you more likely to develop this condition:  A weak body's defense system, also called the immune system.  A condition that affects your lungs and breathing, such as asthma.  What are the signs or symptoms?  Common symptoms of this condition include:  Coughing. This may bring up clear, yellow, or green mucus from your lungs (sputum).  Wheezing.  Runny or stuffy nose.  Having too much mucus in your lungs (chest congestion).  Shortness of breath.  Aches and pains, including sore throat or chest.  How is this diagnosed?  This condition is usually diagnosed based on:  Your symptoms and medical history.  A physical exam.  You may also have other tests, including tests to rule out other conditions, such as pneumonia. These tests include:  A test of lung function.  Test of a mucus sample to look for the presence of  bacteria.  Tests to check the oxygen level in your blood.  Blood tests.  Chest X-ray.  How is this treated?  Most cases of acute bronchitis clear up over time without treatment. Your health care provider may recommend:  Drinking more fluids to help thin your mucus so it is easier to cough up.  Taking inhaled medicine (inhaler) to improve air flow in and out of your lungs.  Using a vaporizer or a humidifier. These are machines that add water to the air to help you breathe better.  Taking a medicine that thins mucus and clears congestion (expectorant).  Taking a medicine that prevents or stops coughing (cough suppressant).  It is not common to take an antibiotic medicine for this condition.  Follow these instructions at home:    Take over-the-counter and prescription medicines only as told by your health care provider.  Use an inhaler, vaporizer, or humidifier as told by your health care provider.  Take two teaspoons (10 mL) of honey at bedtime to lessen coughing at night.  Drink enough fluid to keep your urine pale yellow.  Do not use any products that contain nicotine or tobacco. These products include cigarettes, chewing tobacco, and vaping devices, such as e-cigarettes. If you need help quitting, ask your health care provider.  Get plenty of rest.  Return to your normal activities as told by your health care provider. Ask your health care provider what activities are safe for you.  Keep all follow-up visits. This is important.  How is this prevented?  To lower your risk of getting this condition again:  Wash your hands often with soap and water for at least 20 seconds. If soap and water are not available, use hand .  Avoid contact with people who have cold symptoms.  Try not to touch your mouth, nose, or eyes with your hands.  Avoid breathing in smoke or chemical fumes. Breathing smoke or chemical fumes will make your condition worse.  Get the flu shot every year.  Contact a health care provider if:  Your  symptoms do not improve after 2 weeks.  You have trouble coughing up the mucus.  Your cough keeps you awake at night.  You have a fever.  Get help right away if you:  Cough up blood.  Feel pain in your chest.  Have severe shortness of breath.  Faint or keep feeling like you are going to faint.  Have a severe headache.  Have a fever or chills that get worse.  These symptoms may represent a serious problem that is an emergency. Do not wait to see if the symptoms will go away. Get medical help right away. Call your local emergency services (911 in the [place] Do not drive yourself to the hospital.  Summary  Acute bronchitis is inflammation of the main airways (bronchi) that come off the windpipe (trachea) in the lungs. The swelling causes the airways to get smaller and make more mucus than normal.  Drinking more fluids can help thin your mucus so it is easier to cough up.  Take over-the-counter and prescription medicines only as told by your health care provider.  Do not use any products that contain nicotine or tobacco. These products include cigarettes, chewing tobacco, and vaping devices, such as e-cigarettes. If you need help quitting, ask your health care provider.  Contact a health care provider if your symptoms do not improve after 2 weeks.  This information is not intended to replace advice given to you by your health care provider. Make sure you discuss any questions you have with your health care provider.  Document Revised: 03/30/2023 Document Reviewed: 04/20/2022  Elsevier Patient Education © 2023 [employer]

## 2024-01-02 RX ORDER — BENZONATATE 200 MG/1
200 CAPSULE ORAL 3 TIMES DAILY PRN
Qty: 20 CAPSULE | Refills: 0 | Status: SHIPPED | OUTPATIENT
Start: 2024-01-02

## 2024-01-16 ENCOUNTER — OFFICE VISIT (OUTPATIENT)
Dept: GASTROENTEROLOGY | Facility: CLINIC | Age: 54
End: 2024-01-16
Payer: COMMERCIAL

## 2024-01-16 VITALS
SYSTOLIC BLOOD PRESSURE: 128 MMHG | HEART RATE: 87 BPM | WEIGHT: 236 LBS | HEIGHT: 64 IN | TEMPERATURE: 98.7 F | DIASTOLIC BLOOD PRESSURE: 72 MMHG | BODY MASS INDEX: 40.29 KG/M2 | OXYGEN SATURATION: 97 %

## 2024-01-16 DIAGNOSIS — R13.19 ESOPHAGEAL DYSPHAGIA: Primary | ICD-10-CM

## 2024-01-16 DIAGNOSIS — K21.9 GASTROESOPHAGEAL REFLUX DISEASE, UNSPECIFIED WHETHER ESOPHAGITIS PRESENT: ICD-10-CM

## 2024-01-16 PROCEDURE — 99214 OFFICE O/P EST MOD 30 MIN: CPT | Performed by: PHYSICIAN ASSISTANT

## 2024-01-16 RX ORDER — AZITHROMYCIN 250 MG/1
TABLET, FILM COATED ORAL
COMMUNITY
Start: 2024-01-02 | End: 2024-01-16

## 2024-01-16 RX ORDER — SULFAMETHOXAZOLE AND TRIMETHOPRIM 800; 160 MG/1; MG/1
1 TABLET ORAL EVERY 12 HOURS SCHEDULED
COMMUNITY
Start: 2024-01-04 | End: 2024-01-16

## 2024-01-16 RX ORDER — FLUTICASONE PROPIONATE 50 MCG
SPRAY, SUSPENSION (ML) NASAL
COMMUNITY
Start: 2023-12-23 | End: 2024-01-16

## 2024-01-16 RX ORDER — SUCRALFATE 1 G/1
1 TABLET ORAL 4 TIMES DAILY
Qty: 56 TABLET | Refills: 0 | Status: SHIPPED | OUTPATIENT
Start: 2024-01-16 | End: 2024-01-30

## 2024-01-16 NOTE — PROGRESS NOTES
Follow Up      Patient Name: Sharee Cheek  : 1970   MRN: [id number]     Chief Complaint:  No chief complaint on file.      History of Present Illness: Sharee Cheek is a 53 y.o. female, PMH includes HTN, who is here today for follow up on GERD.    Pt notes chronic GERD, which she previously attributed to her stressful job that she left 2023. She continues to experience daily epigastric burning, esophageal reflux, nausea and belching. She denies specific aggravating foods or factors. She has previously taking omeprazole and now prevacid daily with minimal relief. She notes dysphagia to pills, ongoing long term. She had H Pylori breath test that was negative via PCP a few months ago, on PPI therapy at the time.     Pt denies EtOH, tobacco, illicit substance or NSAID use. She is s/p CCY.     Patient denies associated fever, chills, vomiting, diarrhea, constipation, hematemesis, hematochezia, melena, bloating, weight loss or gain, dysuria, jaundice or bruising.    CSY 2022 with Dr. Linton. Adequate bowel prep conditions. Internal hemorrhoids. 5mm polyp (serrated polyp compatible with hyperplastic polyp) in sigmoid colon, resected and retrieved. Diminutive polyp in rectum at site of previous tattooing, bx reveals hyperplastic polyp. 5mm polyp (hyperplastic) in rectum at site of previous tattooing, resected and retrieved. Recommend repeat CSY in 5 years.     No previous EGD.     Subjective      Review of Systems:   Review of Systems   Constitutional:  Negative for appetite change, chills, diaphoresis, fatigue, fever, unexpected weight gain and unexpected weight loss.   HENT:  Negative for drooling, facial swelling, mouth sores, rhinorrhea, sore throat, tinnitus, trouble swallowing and voice change.    Eyes: Negative.    Respiratory:  Negative for cough, chest tightness and shortness of breath.    Cardiovascular:  Negative for chest pain.   Gastrointestinal:  Positive for abdominal pain  (epigastric), nausea, GERD and indigestion. Negative for blood in stool, constipation, diarrhea and vomiting.   Genitourinary:  Negative for dysuria, flank pain, hematuria and pelvic pain.   Musculoskeletal:  Negative for arthralgias and myalgias.   Skin:  Negative for color change, pallor and rash.   Neurological:  Negative for dizziness, tremors, syncope, weakness and numbness.   Psychiatric/Behavioral:  Negative for hallucinations and sleep disturbance. The patient is not nervous/anxious.    All other systems reviewed and are negative.      Medications:     Current Outpatient Medications:     albuterol sulfate  (90 Base) MCG/ACT inhaler, Inhale 2 puffs Every 4 (Four) Hours As Needed for Wheezing or Shortness of Air., Disp: 18 g, Rfl: 0    benzonatate (TESSALON) 200 MG capsule, Take 1 capsule by mouth 3 (Three) Times a Day As Needed for Cough., Disp: 20 capsule, Rfl: 0    CombiPatch 0.05-0.14 MG/DAY patch, PLACE 1 PATCH ONTO THE SKIN TWICE A WEEK, Disp: , Rfl:     lisinopril (PRINIVIL,ZESTRIL) 40 MG tablet, Take 1 tablet by mouth Daily., Disp: 90 tablet, Rfl: 3    omeprazole (priLOSEC) 40 MG capsule, Take 1 capsule by mouth Daily., Disp: 90 capsule, Rfl: 1    ondansetron ODT (ZOFRAN-ODT) 4 MG disintegrating tablet, Place 1 tablet on the tongue Every 8 (Eight) Hours As Needed for Nausea or Vomiting., Disp: 20 tablet, Rfl: 1    predniSONE (DELTASONE) 10 MG (21) dose pack, Use as directed on package, Disp: 21 tablet, Rfl: 0    rizatriptan MLT (MAXALT-MLT) 10 MG disintegrating tablet, 1 tablet As Needed., Disp: , Rfl:     Synthroid 100 MCG tablet, TAKE 1 TABLET BY MOUTH EVERY DAY, Disp: 90 tablet, Rfl: 1    traZODone (DESYREL) 50 MG tablet, Take 1 tablet by mouth Daily., Disp: , Rfl:     Allergies:   Allergies   Allergen Reactions    Ibuprofen Anaphylaxis    Acetaminophen Other (See Comments)     Atypical sedation      Keflex [Cephalexin]     Penicillins        Social History:   Social History     Socioeconomic  History    Marital status:    Tobacco Use    Smoking status: Never     Passive exposure: Never    Smokeless tobacco: Never   Vaping Use    Vaping Use: Never used   Substance and Sexual Activity    Alcohol use: No    Drug use: No    Sexual activity: Not Currently        Surgical History:   Past Surgical History:   Procedure Laterality Date    DIAGNOSTIC LAPAROSCOPY EXPLORATORY LAPAROTOMY      HYSTEROSCOPY ENDOMETRIAL ABLATION      LAPAROSCOPIC CHOLECYSTECTOMY      TONSILLECTOMY AND ADENOIDECTOMY          Medical History:   Past Medical History:   Diagnosis Date    History of conjunctivitis     History of influenza     History of sinusitis     Hypertension     Insomnia         Objective     Physical Exam:  Vital Signs: There were no vitals filed for this visit.  There is no height or weight on file to calculate BMI.     Physical Exam  Vitals and nursing note reviewed.   Constitutional:       Appearance: Normal appearance. She is normal weight. She is not ill-appearing or diaphoretic.      Comments: BMI 39.90   HENT:      Head: Normocephalic and atraumatic.      Right Ear: External ear normal.      Left Ear: External ear normal.      Nose: Nose normal.      Mouth/Throat:      Mouth: Mucous membranes are moist.      Pharynx: Oropharynx is clear.   Eyes:      Conjunctiva/sclera: Conjunctivae normal.      Pupils: Pupils are equal, round, and reactive to light.   Neck:      Thyroid: No thyromegaly.   Cardiovascular:      Rate and Rhythm: Normal rate and regular rhythm.      Pulses: Normal pulses.      Heart sounds: Normal heart sounds.   Pulmonary:      Effort: Pulmonary effort is normal.      Breath sounds: Normal breath sounds.   Abdominal:      General: Abdomen is flat. Bowel sounds are normal. There is no distension.      Tenderness: There is abdominal tenderness (epigastric).   Musculoskeletal:         General: Normal range of motion.      Cervical back: Normal range of motion and neck supple.   Skin:      General: Skin is warm and dry.   Neurological:      General: No focal deficit present.      Mental Status: She is oriented to person, place, and time.   Psychiatric:         Mood and Affect: Mood normal.         Assessment / Plan      Assessment/Plan:   There are no diagnoses linked to this encounter.     GERD  Esophageal dysphagia  H/o colon polyps   - pt afforded samples of Voquezna, advised to call clinic in 1 week with sx update   - rx for carafate 1g QID sent to pharmacy    - pt given GERD diet instructions, advised to avoid GI irritants such as caffeine, carbonation, EtOH, tobacco, chocolate, peppermint, acid-based foods   - previous labs, endoscopy and pathology reports reviewed   - schedule for EGD   - follow up in clinic after completion of above studies   - call clinic at any time for questions or new / worsened sx    Follow Up:   Return in about 6 weeks (around 2/27/2024).    Plan of care reviewed with the patient at the conclusion of today's visit.  Education was provided regarding diagnosis, management, and any prescribed or recommended OTC medications.  Patient verbalized understanding of and agreement with management plan.     NOTE TO PATIENT: The 21st Century Cures Act makes medical notes like these available to patients in the interest of transparency. However, be advised this is a medical document. It is intended as peer to peer communication. It is written in medical language and may contain abbreviations or verbiage that are unfamiliar. It may appear blunt or direct. Medical documents are intended to carry relevant information, facts as evident, and the clinical opinion of the practitioner.     Time Statement:   Discussed plan of care in detail with patient today. Patient verbally understands and agrees. I have spent 30 minutes reviewing available diagnostics, obtaining history, examining the patient, developing a treatment plan, and educating the patient on disease process and plan of care.      Zenia Snyder PA-C   [facility]

## 2024-01-18 ENCOUNTER — TELEPHONE (OUTPATIENT)
Dept: FAMILY MEDICINE CLINIC | Facility: CLINIC | Age: 54
End: 2024-01-18

## 2024-02-06 DIAGNOSIS — K22.10 EROSIVE ESOPHAGITIS: ICD-10-CM

## 2024-02-06 DIAGNOSIS — K21.9 GASTROESOPHAGEAL REFLUX DISEASE, UNSPECIFIED WHETHER ESOPHAGITIS PRESENT: Primary | ICD-10-CM

## 2024-02-06 RX ORDER — VONOPRAZAN FUMARATE 26.72 MG/1
20 TABLET ORAL DAILY
Qty: 30 TABLET | Refills: 5 | Status: SHIPPED | OUTPATIENT
Start: 2024-02-06

## 2024-02-26 ENCOUNTER — TELEPHONE (OUTPATIENT)
Dept: ENDOCRINOLOGY | Facility: CLINIC | Age: 54
End: 2024-02-26
Payer: COMMERCIAL

## 2024-02-26 NOTE — TELEPHONE ENCOUNTER
PATIENT IS CALLING TO CHECK STATUS OF PRIOR AUTH FOR SYNTHROID. SHE STATES HER PHARMACY SAID THEY SENT US THE DENIAL TO GET PRIOR AUTH. PHONE NUMBER [phone]

## 2024-03-08 ENCOUNTER — OUTSIDE FACILITY SERVICE (OUTPATIENT)
Dept: GASTROENTEROLOGY | Facility: CLINIC | Age: 54
End: 2024-03-08
Payer: COMMERCIAL

## 2024-03-08 PROCEDURE — 43239 EGD BIOPSY SINGLE/MULTIPLE: CPT | Performed by: INTERNAL MEDICINE

## 2024-03-08 PROCEDURE — 88305 TISSUE EXAM BY PATHOLOGIST: CPT | Performed by: INTERNAL MEDICINE

## 2024-03-08 PROCEDURE — 43248 EGD GUIDE WIRE INSERTION: CPT | Performed by: INTERNAL MEDICINE

## 2024-03-11 ENCOUNTER — LAB REQUISITION (OUTPATIENT)
Dept: LAB | Facility: HOSPITAL | Age: 54
End: 2024-03-11
Payer: COMMERCIAL

## 2024-03-11 DIAGNOSIS — R13.19 OTHER DYSPHAGIA: ICD-10-CM

## 2024-03-11 DIAGNOSIS — R11.0 NAUSEA: ICD-10-CM

## 2024-03-11 DIAGNOSIS — Q39.9 CONGENITAL MALFORMATION OF ESOPHAGUS, UNSPECIFIED: ICD-10-CM

## 2024-03-11 DIAGNOSIS — K44.9 DIAPHRAGMATIC HERNIA WITHOUT OBSTRUCTION OR GANGRENE: ICD-10-CM

## 2024-03-11 DIAGNOSIS — K31.89 OTHER DISEASES OF STOMACH AND DUODENUM: ICD-10-CM

## 2024-03-11 DIAGNOSIS — R13.10 DYSPHAGIA, UNSPECIFIED: ICD-10-CM

## 2024-03-12 ENCOUNTER — OFFICE VISIT (OUTPATIENT)
Dept: GASTROENTEROLOGY | Facility: CLINIC | Age: 54
End: 2024-03-12
Payer: COMMERCIAL

## 2024-03-12 VITALS
SYSTOLIC BLOOD PRESSURE: 140 MMHG | HEIGHT: 64 IN | TEMPERATURE: 97.1 F | RESPIRATION RATE: 18 BRPM | WEIGHT: 234 LBS | BODY MASS INDEX: 39.95 KG/M2 | HEART RATE: 84 BPM | DIASTOLIC BLOOD PRESSURE: 90 MMHG | OXYGEN SATURATION: 97 %

## 2024-03-12 DIAGNOSIS — K21.9 GASTROESOPHAGEAL REFLUX DISEASE, UNSPECIFIED WHETHER ESOPHAGITIS PRESENT: Primary | ICD-10-CM

## 2024-03-12 DIAGNOSIS — R13.19 ESOPHAGEAL DYSPHAGIA: ICD-10-CM

## 2024-03-12 LAB — REF LAB TEST METHOD: NORMAL

## 2024-03-12 RX ORDER — LANSOPRAZOLE 30 MG/1
30 CAPSULE, DELAYED RELEASE ORAL DAILY
Qty: 30 CAPSULE | Refills: 1 | Status: SHIPPED | OUTPATIENT
Start: 2024-03-12

## 2024-03-12 RX ORDER — OMEPRAZOLE 20 MG/1
20 CAPSULE, DELAYED RELEASE ORAL DAILY
COMMUNITY
End: 2024-03-12

## 2024-03-12 NOTE — PROGRESS NOTES
Follow Up      Patient Name: Sharee Cheek  : 1970   MRN: [id number]     Chief Complaint:  No chief complaint on file.      History of Present Illness: Sharee Cheek is a 53 y.o. female who is here today for post procedure follow up.     Pt had good response of GERD, epigastric pain with Voquezna 20mg daily but was unable to afford the high copay for the prescription. She has resumed OTC prevacid and overall feels that GERD and esophageal dysphagia are improved pending EGD and dilation. She continues to experience epigastric burning most days of the week.     Patient denies associated fever, chills, nausea, vomiting, diarrhea, constipation, hematemesis, hematochezia, melena, bloating, weight loss or gain, dysuria, jaundice or bruising.    EGD 3/8/24 with Dr. Linton. Mildly tortuous esophagus. Dilation performed with Savary dilator to 54Fr. Normal esophagus otherwise. Small hiatal hernia. Mild gastric body gastritis. Normal duodenum. Pathology pending at time of dictation.     CSY 2022 with Dr. Linton. Adequate bowel prep conditions. Internal hemorrhoids. 5mm polyp (serrated polyp compatible with hyperplastic polyp) in sigmoid colon, resected and retrieved. Diminutive polyp in rectum at site of previous tattooing, bx reveals hyperplastic polyp. 5mm polyp (hyperplastic) in rectum at site of previous tattooing, resected and retrieved. Recommend repeat CSY in 5 years.     Subjective      Review of Systems:   Review of Systems   Constitutional:  Negative for appetite change, chills, diaphoresis, fatigue, fever, unexpected weight gain and unexpected weight loss.   HENT:  Negative for drooling, facial swelling, mouth sores, rhinorrhea, sore throat, tinnitus, trouble swallowing and voice change.    Eyes: Negative.    Respiratory:  Negative for cough, chest tightness and shortness of breath.    Cardiovascular:  Negative for chest pain.   Gastrointestinal:  Positive for abdominal pain (epigastric).  Negative for blood in stool, constipation, diarrhea, nausea, vomiting, GERD and indigestion.   Genitourinary:  Negative for dysuria, flank pain, hematuria and pelvic pain.   Musculoskeletal:  Negative for arthralgias and myalgias.   Skin:  Negative for color change, pallor and rash.   Neurological:  Negative for dizziness, tremors, syncope, weakness and numbness.   Psychiatric/Behavioral:  Negative for hallucinations and sleep disturbance. The patient is not nervous/anxious.    All other systems reviewed and are negative.      Medications:     Current Outpatient Medications:     albuterol sulfate  (90 Base) MCG/ACT inhaler, Inhale 2 puffs Every 4 (Four) Hours As Needed for Wheezing or Shortness of Air., Disp: 18 g, Rfl: 0    CombiPatch 0.05-0.14 MG/DAY patch, PLACE 1 PATCH ONTO THE SKIN TWICE A WEEK, Disp: , Rfl:     lisinopril (PRINIVIL,ZESTRIL) 40 MG tablet, Take 1 tablet by mouth Daily., Disp: 90 tablet, Rfl: 3    Synthroid 100 MCG tablet, TAKE 1 TABLET BY MOUTH EVERY DAY, Disp: 90 tablet, Rfl: 1    traZODone (DESYREL) 50 MG tablet, Take 1 tablet by mouth Daily., Disp: , Rfl:     Vonoprazan Fumarate (Voquezna) 20 MG tablet, Take 1 tablet by mouth Daily., Disp: 30 tablet, Rfl: 5    Allergies:   Allergies   Allergen Reactions    Ibuprofen Anaphylaxis    Acetaminophen Other (See Comments)     Atypical sedation      Keflex [Cephalexin]     Penicillins        Social History:   Social History     Socioeconomic History    Marital status:    Tobacco Use    Smoking status: Never     Passive exposure: Never    Smokeless tobacco: Never   Vaping Use    Vaping status: Never Used   Substance and Sexual Activity    Alcohol use: No    Drug use: No    Sexual activity: Not Currently        Surgical History:   Past Surgical History:   Procedure Laterality Date    COLONOSCOPY      DIAGNOSTIC LAPAROSCOPY EXPLORATORY LAPAROTOMY      HYSTEROSCOPY ENDOMETRIAL ABLATION      LAPAROSCOPIC CHOLECYSTECTOMY      TONSILLECTOMY  AND ADENOIDECTOMY          Medical History:   Past Medical History:   Diagnosis Date    History of conjunctivitis     History of influenza     History of sinusitis     Hypertension     Insomnia         Objective     Physical Exam:  Vital Signs: There were no vitals filed for this visit.  There is no height or weight on file to calculate BMI.     Physical Exam  Vitals and nursing note reviewed.   Constitutional:       Appearance: Normal appearance. She is normal weight. She is not ill-appearing or diaphoretic.      Comments: BMI 39.56. Pleasantly conversant.    HENT:      Head: Normocephalic and atraumatic.      Right Ear: External ear normal.      Left Ear: External ear normal.      Nose: Nose normal.      Mouth/Throat:      Mouth: Mucous membranes are moist.      Pharynx: Oropharynx is clear.   Eyes:      Conjunctiva/sclera: Conjunctivae normal.      Pupils: Pupils are equal, round, and reactive to light.   Neck:      Thyroid: No thyromegaly.   Cardiovascular:      Rate and Rhythm: Normal rate and regular rhythm.      Pulses: Normal pulses.      Heart sounds: Normal heart sounds.   Pulmonary:      Effort: Pulmonary effort is normal.      Breath sounds: Normal breath sounds.   Abdominal:      General: Abdomen is flat. Bowel sounds are normal. There is no distension.      Tenderness: There is no abdominal tenderness.   Musculoskeletal:         General: Normal range of motion.      Cervical back: Normal range of motion and neck supple.   Skin:     General: Skin is warm and dry.   Neurological:      General: No focal deficit present.      Mental Status: She is oriented to person, place, and time.   Psychiatric:         Mood and Affect: Mood normal.         Assessment / Plan      Assessment/Plan:   There are no diagnoses linked to this encounter.     GERD  Esophageal dysphagia s/p dilation via EGD 3/2024   - rx for lansoprazole 30mg daily sent to pharmacy, pt advised to call clinic in 2 weeks with sx update   - pt given  GERD diet instructions, advised to avoid GI irritants such as caffeine, carbonation, EtOH, tobacco, chocolate, peppermint, acid-based foods   - previous endoscopy reports reviewed   - follow up in clinic in 1 year, or after completion of above studies   - call clinic at any time for questions or new / worsened sx    Follow Up:   Return in about 1 year (around 3/12/2025).    Plan of care reviewed with the patient at the conclusion of today's visit.  Education was provided regarding diagnosis, management, and any prescribed or recommended OTC medications.  Patient verbalized understanding of and agreement with management plan.     NOTE TO PATIENT: The 21st Century Cures Act makes medical notes like these available to patients in the interest of transparency. However, be advised this is a medical document. It is intended as peer to peer communication. It is written in medical language and may contain abbreviations or verbiage that are unfamiliar. It may appear blunt or direct. Medical documents are intended to carry relevant information, facts as evident, and the clinical opinion of the practitioner.     Time Statement:   Discussed plan of care in detail with patient today. Patient verbally understands and agrees. I have spent 30 minutes reviewing available diagnostics, obtaining history, examining the patient, developing a treatment plan, and educating the patient on disease process and plan of care.     Zenia Snyder PA-C   [facility]

## 2024-03-19 ENCOUNTER — TELEPHONE (OUTPATIENT)
Dept: ENDOCRINOLOGY | Facility: CLINIC | Age: 54
End: 2024-03-19
Payer: COMMERCIAL

## 2024-03-19 RX ORDER — LEVOTHYROXINE SODIUM 100 MCG
100 TABLET ORAL DAILY
Qty: 90 TABLET | Refills: 1 | Status: SHIPPED | OUTPATIENT
Start: 2024-03-19

## 2024-03-19 NOTE — TELEPHONE ENCOUNTER
Rx Refill Note  Requested Prescriptions     Pending Prescriptions Disp Refills    Synthroid 100 MCG tablet 90 tablet 1     Sig: Take 1 tablet by mouth Daily.      Last office visit with prescribing clinician: Visit date not found   Last telemedicine visit with prescribing clinician: Visit date not found   Next office visit with prescribing clinician: 7/23/2024                         Would you like a call back once the refill request has been completed: [] Yes [] No    If the office needs to give you a call back, can they leave a voicemail: [] Yes [] No    Dalia Zimmer MA  03/19/24, 09:22 EDT

## 2024-03-19 NOTE — TELEPHONE ENCOUNTER
[facility] called states pt insurance plan does not cover KARLY does have generic if KARLY not on prescription can be ran as brand and pt will get cheaper. Please contact chris levy @ [phone] REF # [id number]

## 2024-03-20 ENCOUNTER — TELEPHONE (OUTPATIENT)
Dept: ENDOCRINOLOGY | Facility: CLINIC | Age: 54
End: 2024-03-20

## 2024-03-20 NOTE — TELEPHONE ENCOUNTER
Scott from [employer] called about this patients synthroid. He said that this pts insurance does not cover the synthroid but will cover levothyroxine. He asked to either send them a script for levothyroxine or call him back at [phone]. ref #: [id number]. to discuss.

## 2024-03-20 NOTE — TELEPHONE ENCOUNTER
Please contact patient and notify her that her insurance will not cover Synthroid, but they will cover generic levothyroxine. Please ask if she is ok with generic levothyroxine before we send a new prescription. We may be able to complete a prior authorization to approve Synthroid, but she will need to be seen in order for this to be completed.

## 2024-03-21 ENCOUNTER — OFFICE VISIT (OUTPATIENT)
Dept: ENDOCRINOLOGY | Facility: CLINIC | Age: 54
End: 2024-03-21
Payer: COMMERCIAL

## 2024-03-21 VITALS
BODY MASS INDEX: 39.95 KG/M2 | WEIGHT: 234 LBS | HEART RATE: 72 BPM | SYSTOLIC BLOOD PRESSURE: 140 MMHG | HEIGHT: 64 IN | DIASTOLIC BLOOD PRESSURE: 78 MMHG

## 2024-03-21 DIAGNOSIS — R73.03 PREDIABETES: ICD-10-CM

## 2024-03-21 DIAGNOSIS — E06.3 HYPOTHYROIDISM DUE TO HASHIMOTO'S THYROIDITIS: Primary | ICD-10-CM

## 2024-03-21 DIAGNOSIS — E04.1 THYROID NODULE: ICD-10-CM

## 2024-03-21 DIAGNOSIS — E03.8 HYPOTHYROIDISM DUE TO HASHIMOTO'S THYROIDITIS: Primary | ICD-10-CM

## 2024-03-21 LAB — HBA1C MFR BLD: 6 % (ref 4.8–5.6)

## 2024-03-21 PROCEDURE — 83036 HEMOGLOBIN GLYCOSYLATED A1C: CPT | Performed by: INTERNAL MEDICINE

## 2024-03-21 PROCEDURE — 84439 ASSAY OF FREE THYROXINE: CPT | Performed by: INTERNAL MEDICINE

## 2024-03-21 PROCEDURE — 84443 ASSAY THYROID STIM HORMONE: CPT | Performed by: INTERNAL MEDICINE

## 2024-03-21 PROCEDURE — 99214 OFFICE O/P EST MOD 30 MIN: CPT | Performed by: INTERNAL MEDICINE

## 2024-03-21 PROCEDURE — 76536 US EXAM OF HEAD AND NECK: CPT | Performed by: INTERNAL MEDICINE

## 2024-03-21 PROCEDURE — 36415 COLL VENOUS BLD VENIPUNCTURE: CPT | Performed by: INTERNAL MEDICINE

## 2024-03-21 NOTE — PROGRESS NOTES
"Thyroid Problem    Subjective   Sharee Cheek is a 53 y.o. female. she is being seen for follow-up  Hypothyroidism dx several years ago.  Levothyroxine resulted in multiple dose changes and symptoms. Patient has been on the same dose for several years, after switching to Synthroid brand she is feeling better. Takes Synthroid 100 mcg now.      Thyroid nodule noted on us in 2014 - right 1.1 cm nodule. Patient reported sx of tenderness in the lower portion of the thyroid and swallowing difficulties.     A1C was in prediabetes range in the last year. Discussed dietary recommendations.     Medications:    Current Outpatient Medications:     lansoprazole (PREVACID) 30 MG capsule, Take 1 capsule by mouth Daily., Disp: 30 capsule, Rfl: 1    lisinopril (PRINIVIL,ZESTRIL) 40 MG tablet, Take 1 tablet by mouth Daily., Disp: 90 tablet, Rfl: 3    Synthroid 100 MCG tablet, Take 1 tablet by mouth Daily., Disp: 90 tablet, Rfl: 1    traZODone (DESYREL) 50 MG tablet, Take 1 tablet by mouth Daily., Disp: , Rfl:       Review of Systems   Psychiatric/Behavioral:  Positive for sleep disturbance. The patient is nervous/anxious.    All other systems reviewed and are negative.      Objective   Blood pressure 140/78, pulse 72, height 163.8 cm (64.49\"), weight 106 kg (234 lb), last menstrual period 11/11/2019.   Physical Exam   Constitutional: She is oriented to person, place, and time. She appears well-developed and well-nourished. She is obese.  HENT:   Head: Normocephalic and atraumatic.   Eyes: Conjunctivae are normal.   Neck: Thyromegaly (right 1 cm nodule, tender on palpation ) present.   Cardiovascular: Normal rate, regular rhythm and normal heart sounds.   Pulmonary/Chest: Effort normal and breath sounds normal.   Lymphadenopathy:     She has no cervical adenopathy.   Neurological: She is alert and oriented to person, place, and time. She has normal reflexes.   Skin: No rash noted.   Psychiatric: Thought content normal.   Nursing " note and vitals reviewed.        Results for orders placed or performed in visit on 24   TISSUE EXAM, [facility] ([facility])    Specimen: Tissue   Result Value Ref Range    Reference Lab Report       [facility]  [address] [place] [address]  [id number]  Phone: [phone] or [phone]  Fax: [phone]  Roger Krishann M.D., Medical Director    PATIENT NAME                           LABORATORY NO.  KYLE MACHADO                     [id number]  [id number]                         AGE              SEX  SSN           CLIENT REF #  [facility]           53      1970  F    [id number]   [id number]    [address]              REQUESTING MEUNICE     [place]END[place] [address]                FAHAD LINTON TAMMY  DATE COLLECTED      DATE RECEIVED      DATE REPORTED  2024    DIAGNOSIS:  A.   DUODENUM, BIOPSY:  Small bowel mucosa with no significant pathologic abnormality  B.   STOMACH, BIOPSY:  Reactive gastropathy  No Helicobacter pylori like organisms identified on H&E stained slide  No intestinal metaplasia or dysplasia identified  C.    ESOPHAGUS, BIOPSY:  Squamous mucosa with mild reactive/reflux related changes  No increased intraepithelial eosinophils, intestinal metaplasia or  dysplasia identified    CLINICAL HISTORY:  Other diseases of stomach and duodenum, diaphragmatic hernia without  obstruction or gangrene, congenital malformation of esophagus, unspecified,  nausea, dysphagia, unspecified, other dysphagia    SPECIMENS RECEIVED:  A.  DUODENUM, BIOPSY  B.  STOMACH, BIOPSY  C.  ESOPHAGUS, BIOPSY    MICROSCOPIC DESCRIPTION:  Tissue blocks are prepared and slides are examined microscopically on all  specimens. See diagnosis for details.    Professional interpretation rendered by Jazmin Linton D.O., F.C.A.P. at  [facility], [employer], [address] [place] [address]  "[id number].    GROSS DESCRIPTION:  A.  Received in formalin labeled \"duodenum\" and consists of 2 pale-tan  fragments of soft tissue measuring 0.4 x 0.2 x 0.2 cm and 0.3 x 0.2 x 0.2  cm.  The specimen is entirely submitted in cassette A1.  [facility]  B.  Received in  formalin labeled \"stomach\" and consists of 2 pale-tan  fragments of soft tissue measuring 0.5 x 0.2 x 0.2 cm and 0.4 x 0.3 x 0.2  cm.  The specimen is entirely submitted in cassette B1.  [facility]  C.  Received in formalin labeled \"esophagus\" and consists of a tan-white  fragment of soft tissue measuring 0.6 x 0.2 x 0.1 cm.  The specimen is  entirely submitted in cassette C1.  [facility]    REVIEWED, DIAGNOSED AND ELECTRONICALLY  SIGNED BY:    Jazmin Linton D.O., CANDIDO  CPT CODES:  88305x3               Thyroid ultrasound 03/21/24             Real time high resolution imaging of the thyroid gland was performed in transverse and longitudinal planes.      The right lobe measured 4.63 cm L x 1.41 cm AP x 1.49 cm in TV dimension.    The isthmus measured 0.28 cm in thickness.    The left thyroid lobe measured 4.56 cm L x 1.4 cm AP x 1.12 cm in TV dimension.    Thyroid gland is heterogeneous and contains single nodule.    Nodule 1   is located in the right mid lobe and measures 1.12 x 0.89 x 1.2 cm (L X AP X TV).  This nodule is solid, homogeneous, hyperechoic with well-defined margins, and Grade II vascularity on Color Flow Doppler.  It has no artifacts      No pathologic lymph nodes were seen.      Assessment: Right dominant nodule with benign u/s characteristics, the nodule is decreased in size.   Repeat ultrasound in 24 months.       Assessment/Plan:    Problem List Items Addressed This Visit          Other    Hypothyroidism due to Hashimoto's thyroiditis - Primary    Prediabetes    Thyroid nodule    Relevant Orders    US Thyroid (Completed)   Right thyroid nodule decreased in size. Cont with every 2 years u/s (2026)   Ordered thyroid labs today and added A1C  Diabetic " diet recommendations reviewed. Recommended regular exercises.   F/u in 12 months

## 2024-03-22 LAB
T4 FREE SERPL-MCNC: 1.46 NG/DL (ref 0.93–1.7)
TSH SERPL DL<=0.05 MIU/L-ACNC: 0.31 UIU/ML (ref 0.27–4.2)

## 2024-04-01 NOTE — TELEPHONE ENCOUNTER
Pt called requesting to consult on pa for synthroid or per note from julia on 03/21/24 changing to levothyroxine.

## 2024-04-02 NOTE — TELEPHONE ENCOUNTER
Called and spoke with insurance and did a PA over the phone. They requested we send chart notes.  Chart notes faxed.

## 2024-04-09 DIAGNOSIS — R13.19 ESOPHAGEAL DYSPHAGIA: ICD-10-CM

## 2024-04-09 DIAGNOSIS — K21.9 GASTROESOPHAGEAL REFLUX DISEASE, UNSPECIFIED WHETHER ESOPHAGITIS PRESENT: ICD-10-CM

## 2024-04-09 RX ORDER — LANSOPRAZOLE 30 MG/1
30 CAPSULE, DELAYED RELEASE ORAL DAILY
Qty: 90 CAPSULE | Refills: 3 | Status: SHIPPED | OUTPATIENT
Start: 2024-04-09

## 2024-04-09 NOTE — TELEPHONE ENCOUNTER
PATIENT STATES THAT SHE RECEIVED NOTICE FROM [employer] THAT HER PRIOR AUTH WAS APPROVED BUT THEY NEED A NEW RX SENT IN FOR THIS MEDICATION.

## 2024-04-09 NOTE — TELEPHONE ENCOUNTER
Rx Refill Note  Requested Prescriptions     Pending Prescriptions Disp Refills    lansoprazole (PREVACID) 30 MG capsule 90 capsule 1     Sig: Take 1 capsule by mouth Daily.      Last office visit with prescribing clinician: 3/12/2024   Last telemedicine visit with prescribing clinician: Visit date not found   Next office visit with prescribing clinician: Visit date not found                             Briana Lloyd MA  04/09/24, 11:01 EDT

## 2024-04-10 RX ORDER — LEVOTHYROXINE SODIUM 100 MCG
100 TABLET ORAL DAILY
Qty: 90 TABLET | Refills: 1 | Status: SHIPPED | OUTPATIENT
Start: 2024-04-10

## 2024-04-17 DIAGNOSIS — R13.19 ESOPHAGEAL DYSPHAGIA: ICD-10-CM

## 2024-04-17 DIAGNOSIS — K21.9 GASTROESOPHAGEAL REFLUX DISEASE WITHOUT ESOPHAGITIS: ICD-10-CM

## 2024-04-18 RX ORDER — OMEPRAZOLE 40 MG/1
40 CAPSULE, DELAYED RELEASE ORAL DAILY
Qty: 90 CAPSULE | Refills: 1 | OUTPATIENT
Start: 2024-04-18

## 2024-04-29 DIAGNOSIS — I10 PRIMARY HYPERTENSION: Chronic | ICD-10-CM

## 2024-04-29 RX ORDER — LISINOPRIL 40 MG/1
40 TABLET ORAL DAILY
Qty: 90 TABLET | Refills: 3 | Status: SHIPPED | OUTPATIENT
Start: 2024-04-29

## 2024-06-17 ENCOUNTER — TELEPHONE (OUTPATIENT)
Dept: ENDOCRINOLOGY | Facility: CLINIC | Age: 54
End: 2024-06-17
Payer: COMMERCIAL

## 2024-06-17 NOTE — TELEPHONE ENCOUNTER
PHARMACY NEEDS US TO WAIVE THE [facility] ON PATIENTS SYNTHROID PRESCRIPTION AS THEY DO NOT DISPENSE GENERIC THEY DISPENSE BRAND NAME SYNTHROID. PHONE NUMBER [phone] REF#[id number]

## 2024-06-18 RX ORDER — LEVOTHYROXINE SODIUM 0.1 MG/1
100 TABLET ORAL DAILY
Qty: 90 TABLET | Refills: 3 | Status: SHIPPED | OUTPATIENT
Start: 2024-06-18 | End: 2025-06-18

## 2024-06-27 PROCEDURE — 87086 URINE CULTURE/COLONY COUNT: CPT

## 2024-06-28 ENCOUNTER — PATIENT ROUNDING (BHMG ONLY) (OUTPATIENT)
Dept: URGENT CARE | Facility: CLINIC | Age: 54
End: 2024-06-28
Payer: COMMERCIAL

## 2024-07-23 ENCOUNTER — APPOINTMENT (OUTPATIENT)
Dept: LAB | Facility: HOSPITAL | Age: 54
End: 2024-07-23
Payer: COMMERCIAL

## 2024-07-23 ENCOUNTER — TRANSCRIBE ORDERS (OUTPATIENT)
Dept: LAB | Facility: HOSPITAL | Age: 54
End: 2024-07-23
Payer: COMMERCIAL

## 2024-07-23 ENCOUNTER — HOSPITAL ENCOUNTER (OUTPATIENT)
Dept: CARDIOLOGY | Facility: HOSPITAL | Age: 54
Discharge: HOME | End: 2024-07-23
Payer: COMMERCIAL

## 2024-07-23 ENCOUNTER — TRANSCRIBE ORDERS (OUTPATIENT)
Dept: CARDIOLOGY | Facility: HOSPITAL | Age: 54
End: 2024-07-23
Payer: COMMERCIAL

## 2024-07-23 DIAGNOSIS — Z01.812 PRE-OPERATIVE LABORATORY EXAMINATION: Primary | ICD-10-CM

## 2024-07-23 DIAGNOSIS — Z01.812 PRE-OPERATIVE LABORATORY EXAMINATION: ICD-10-CM

## 2024-07-23 LAB
ALBUMIN SERPL-MCNC: 4.2 G/DL (ref 3.5–5.2)
ALBUMIN/GLOB SERPL: 1.4 G/DL
ALP SERPL-CCNC: 88 U/L (ref 39–117)
ALT SERPL W P-5'-P-CCNC: 12 U/L (ref 1–33)
ANION GAP SERPL CALCULATED.3IONS-SCNC: 9.6 MMOL/L (ref 5–15)
AST SERPL-CCNC: 15 U/L (ref 1–32)
BILIRUB SERPL-MCNC: 0.3 MG/DL (ref 0–1.2)
BUN SERPL-MCNC: 8 MG/DL (ref 6–20)
BUN/CREAT SERPL: 10.5 (ref 7–25)
CALCIUM SPEC-SCNC: 9 MG/DL (ref 8.6–10.5)
CHLORIDE SERPL-SCNC: 103 MMOL/L (ref 98–107)
CO2 SERPL-SCNC: 25.4 MMOL/L (ref 22–29)
CREAT SERPL-MCNC: 0.76 MG/DL (ref 0.57–1)
DEPRECATED RDW RBC AUTO: 40.9 FL (ref 37–54)
EGFRCR SERPLBLD CKD-EPI 2021: 93.8 ML/MIN/1.73
ERYTHROCYTE [DISTWIDTH] IN BLOOD BY AUTOMATED COUNT: 13.1 % (ref 12.3–15.4)
GLOBULIN UR ELPH-MCNC: 3 GM/DL
GLUCOSE SERPL-MCNC: 119 MG/DL (ref 65–99)
HCT VFR BLD AUTO: 40.2 % (ref 34–46.6)
HGB BLD-MCNC: 13 G/DL (ref 12–15.9)
MCH RBC QN AUTO: 27.4 PG (ref 26.6–33)
MCHC RBC AUTO-ENTMCNC: 32.3 G/DL (ref 31.5–35.7)
MCV RBC AUTO: 84.6 FL (ref 79–97)
PLATELET # BLD AUTO: 305 10*3/MM3 (ref 140–450)
PMV BLD AUTO: 9.9 FL (ref 6–12)
POTASSIUM SERPL-SCNC: 4.4 MMOL/L (ref 3.5–5.2)
PROT SERPL-MCNC: 7.2 G/DL (ref 6–8.5)
QT INTERVAL: 404 MS
QTC INTERVAL: 445 MS
RBC # BLD AUTO: 4.75 10*6/MM3 (ref 3.77–5.28)
SODIUM SERPL-SCNC: 138 MMOL/L (ref 136–145)
WBC NRBC COR # BLD AUTO: 7.88 10*3/MM3 (ref 3.4–10.8)

## 2024-07-23 PROCEDURE — 85027 COMPLETE CBC AUTOMATED: CPT | Performed by: OTOLARYNGOLOGY

## 2024-07-23 PROCEDURE — 36415 COLL VENOUS BLD VENIPUNCTURE: CPT | Performed by: OTOLARYNGOLOGY

## 2024-07-23 PROCEDURE — 93005 ELECTROCARDIOGRAM TRACING: CPT | Performed by: OTOLARYNGOLOGY

## 2024-07-23 PROCEDURE — 80053 COMPREHEN METABOLIC PANEL: CPT | Performed by: OTOLARYNGOLOGY

## 2024-07-25 ENCOUNTER — TELEPHONE (OUTPATIENT)
Dept: FAMILY MEDICINE CLINIC | Facility: CLINIC | Age: 54
End: 2024-07-25

## 2024-07-25 NOTE — TELEPHONE ENCOUNTER
"    Caller: Sharee Cheek \"Nadia\"    Relationship to patient: Self    Best call back number: [phone]     Chief complaint: NA    Type of visit: PRE  OP    Requested date: ASAP SURGERY IS 07/31/2024     If rescheduling, when is the original appointment: NA     Additional notes:PATIENT IS HAVING SURGERY ON 07/31/2024 AND NEEDS CLEARANCE TO DO TO. PLEASE CALL ASAP TO SET UP APT.            "

## 2024-07-25 NOTE — TELEPHONE ENCOUNTER
Patient scheduled with you Saturday for 1015. She is having nasal scope and bilateral tubes done. No forms to fill out they just want clearance since EKG was abnormal.

## 2024-07-27 ENCOUNTER — OFFICE VISIT (OUTPATIENT)
Dept: FAMILY MEDICINE CLINIC | Facility: CLINIC | Age: 54
End: 2024-07-27
Payer: COMMERCIAL

## 2024-07-27 VITALS
OXYGEN SATURATION: 98 % | HEART RATE: 94 BPM | BODY MASS INDEX: 38.32 KG/M2 | WEIGHT: 230 LBS | HEIGHT: 65 IN | TEMPERATURE: 97.9 F | SYSTOLIC BLOOD PRESSURE: 127 MMHG | DIASTOLIC BLOOD PRESSURE: 60 MMHG

## 2024-07-27 DIAGNOSIS — Z01.818 PRE-OP EXAM: Primary | ICD-10-CM

## 2024-07-27 DIAGNOSIS — R94.31 ABNORMAL FINDING ON EKG: ICD-10-CM

## 2024-07-27 PROBLEM — M25.512 CHRONIC LEFT SHOULDER PAIN: Status: RESOLVED | Noted: 2020-10-01 | Resolved: 2024-07-27

## 2024-07-27 PROBLEM — M75.52 BURSITIS OF LEFT SHOULDER: Status: RESOLVED | Noted: 2020-10-01 | Resolved: 2024-07-27

## 2024-07-27 PROBLEM — G89.29 CHRONIC LEFT SHOULDER PAIN: Status: RESOLVED | Noted: 2020-10-01 | Resolved: 2024-07-27

## 2024-07-27 PROCEDURE — 99213 OFFICE O/P EST LOW 20 MIN: CPT | Performed by: FAMILY MEDICINE

## 2024-07-27 NOTE — ASSESSMENT & PLAN NOTE
Patient has incidental abnormal findings on EKG but is asymptomatic and has never had heart issues in the past. Blood pressure well controlled on medication. Average risk of MACE perioperatively.

## 2024-07-27 NOTE — PROGRESS NOTES
Sharee Cheek is a 54 y.o. female who presents today for Pre-op Exam      Patient is scheduled to go in for nasal scope and TM tube placement with Jerel on 7/31/24. She had EKG which showed some abnormalities with no EKG for comparison. Patient has had no symptoms of CP, SOA, palpitations, or exercise intolerance.        Review of Systems   Constitutional:  Negative for chills, diaphoresis, fatigue, fever and unexpected weight loss.   HENT:  Positive for hearing loss and sinus pressure. Negative for congestion, ear pain and sore throat.    Eyes:  Negative for visual disturbance.   Respiratory:  Negative for cough, chest tightness, shortness of breath and wheezing.    Cardiovascular:  Negative for chest pain, palpitations and leg swelling.   Gastrointestinal:  Negative for abdominal pain, blood in stool, constipation, diarrhea, nausea, vomiting and GERD.   Endocrine: Negative for polydipsia and polyuria.   Genitourinary:  Negative for difficulty urinating.   Musculoskeletal:  Negative for joint swelling.   Skin:  Negative for rash and skin lesions.   Allergic/Immunologic: Negative for environmental allergies.   Neurological:  Positive for headache (chronic mild headache). Negative for dizziness, seizures, syncope and light-headedness.   Hematological:  Does not bruise/bleed easily.   Psychiatric/Behavioral:  Negative for suicidal ideas.         The following portions of the patient's history were reviewed and updated as appropriate: allergies, current medications, past family history, past medical history, past social history, past surgical history and problem list.    Current Outpatient Medications on File Prior to Visit   Medication Sig Dispense Refill    levothyroxine (Synthroid) 100 MCG tablet Take 1 tablet by mouth Daily. 90 tablet 3    lisinopril (PRINIVIL,ZESTRIL) 40 MG tablet TAKE 1 TABLET BY MOUTH EVERY DAY 90 tablet 3    lansoprazole (PREVACID) 30 MG capsule Take 1 capsule by mouth Daily. (Patient  "not taking: Reported on 7/27/2024) 90 capsule 3    [DISCONTINUED] traZODone (DESYREL) 50 MG tablet Take 1 tablet by mouth Daily. (Patient not taking: Reported on 7/27/2024)       No current facility-administered medications on file prior to visit.       Allergies   Allergen Reactions    Ibuprofen Anaphylaxis    Acetaminophen Other (See Comments)     Atypical sedation      Keflex [Cephalexin]     Penicillins         Visit Vitals  /60   Pulse 94   Temp 97.9 °F (36.6 °C) (Infrared)   Ht 165 cm (64.96\")   Wt 104 kg (230 lb)   LMP 11/11/2019 (Exact Date)   SpO2 98%   BMI 38.32 kg/m²        Physical Exam  Constitutional:       General: She is not in acute distress.     Appearance: She is well-developed. She is not diaphoretic.   HENT:      Head: Atraumatic.   Cardiovascular:      Rate and Rhythm: Normal rate and regular rhythm.      Heart sounds: Normal heart sounds. No murmur heard.     No friction rub. No gallop.   Pulmonary:      Effort: Pulmonary effort is normal. No respiratory distress.      Breath sounds: Normal breath sounds. No stridor. No wheezing, rhonchi or rales.   Musculoskeletal:      Cervical back: Normal range of motion and neck supple.   Skin:     General: Skin is warm and dry.   Neurological:      Mental Status: She is alert and oriented to person, place, and time.   Psychiatric:         Behavior: Behavior normal.          Results for orders placed or performed during the hospital encounter of 07/23/24   ECG 12 Lead   Result Value Ref Range    QT Interval 404 ms    QTC Interval 445 ms        Problems Addressed this Visit          Cardiac and Vasculature    Abnormal finding on EKG     Patient given referral to cardiology for further eval.          Relevant Orders    Ambulatory Referral to Cardiology (Completed)       Health Encounters    Pre-op exam - Primary     Patient has incidental abnormal findings on EKG but is asymptomatic and has never had heart issues in the past. Blood pressure well " controlled on medication. Average risk of MACE perioperatively.           Diagnoses         Codes Comments    Pre-op exam    -  Primary ICD-10-CM: Z01.818  ICD-9-CM: V72.84     Abnormal finding on EKG     ICD-10-CM: R94.31  ICD-9-CM: 794.31             Return if symptoms worsen or fail to improve.    20 minutes was spent on this patient encounter which included history taking, physical exam, answering patient questions, counseling, discussing treatment plan, placing orders, and documentation.    Jamari Nelson MD   7/27/2024

## 2025-03-25 ENCOUNTER — OFFICE VISIT (OUTPATIENT)
Dept: ENDOCRINOLOGY | Facility: CLINIC | Age: 55
End: 2025-03-25
Payer: COMMERCIAL

## 2025-03-25 VITALS
DIASTOLIC BLOOD PRESSURE: 77 MMHG | HEART RATE: 72 BPM | SYSTOLIC BLOOD PRESSURE: 137 MMHG | HEIGHT: 65 IN | BODY MASS INDEX: 39.82 KG/M2 | WEIGHT: 239 LBS | OXYGEN SATURATION: 97 %

## 2025-03-25 DIAGNOSIS — R73.03 PREDIABETES: ICD-10-CM

## 2025-03-25 DIAGNOSIS — E55.9 VITAMIN D DEFICIENCY: ICD-10-CM

## 2025-03-25 DIAGNOSIS — E06.3 HYPOTHYROIDISM DUE TO HASHIMOTO'S THYROIDITIS: Primary | ICD-10-CM

## 2025-03-25 DIAGNOSIS — E04.1 THYROID NODULE: ICD-10-CM

## 2025-03-25 LAB
25(OH)D3 SERPL-MCNC: 24.3 NG/ML (ref 30–100)
ALBUMIN SERPL-MCNC: 4.1 G/DL (ref 3.5–5.2)
ALBUMIN/GLOB SERPL: 1.2 G/DL
ALP SERPL-CCNC: 99 U/L (ref 39–117)
ALT SERPL W P-5'-P-CCNC: 12 U/L (ref 1–33)
ANION GAP SERPL CALCULATED.3IONS-SCNC: 8.1 MMOL/L (ref 5–15)
AST SERPL-CCNC: 14 U/L (ref 1–32)
BILIRUB SERPL-MCNC: 0.4 MG/DL (ref 0–1.2)
BUN SERPL-MCNC: 12 MG/DL (ref 6–20)
BUN/CREAT SERPL: 14.6 (ref 7–25)
CALCIUM SPEC-SCNC: 9.7 MG/DL (ref 8.6–10.5)
CHLORIDE SERPL-SCNC: 100 MMOL/L (ref 98–107)
CO2 SERPL-SCNC: 28.9 MMOL/L (ref 22–29)
CREAT SERPL-MCNC: 0.82 MG/DL (ref 0.57–1)
CRP SERPL-MCNC: 0.63 MG/DL (ref 0–0.5)
EGFRCR SERPLBLD CKD-EPI 2021: 85.7 ML/MIN/1.73
ERYTHROCYTE [SEDIMENTATION RATE] IN BLOOD: 30 MM/HR (ref 0–30)
GLOBULIN UR ELPH-MCNC: 3.5 GM/DL
GLUCOSE SERPL-MCNC: 107 MG/DL (ref 65–99)
HBA1C MFR BLD: 5.7 % (ref 4.8–5.6)
POTASSIUM SERPL-SCNC: 4.3 MMOL/L (ref 3.5–5.2)
PROT SERPL-MCNC: 7.6 G/DL (ref 6–8.5)
SODIUM SERPL-SCNC: 137 MMOL/L (ref 136–145)
T4 FREE SERPL-MCNC: 1.23 NG/DL (ref 0.92–1.68)
TSH SERPL DL<=0.05 MIU/L-ACNC: 3.46 UIU/ML (ref 0.27–4.2)

## 2025-03-25 PROCEDURE — 86140 C-REACTIVE PROTEIN: CPT | Performed by: INTERNAL MEDICINE

## 2025-03-25 PROCEDURE — 84443 ASSAY THYROID STIM HORMONE: CPT | Performed by: INTERNAL MEDICINE

## 2025-03-25 PROCEDURE — 84439 ASSAY OF FREE THYROXINE: CPT | Performed by: INTERNAL MEDICINE

## 2025-03-25 PROCEDURE — 85652 RBC SED RATE AUTOMATED: CPT | Performed by: INTERNAL MEDICINE

## 2025-03-25 PROCEDURE — 82306 VITAMIN D 25 HYDROXY: CPT | Performed by: INTERNAL MEDICINE

## 2025-03-25 PROCEDURE — 83036 HEMOGLOBIN GLYCOSYLATED A1C: CPT | Performed by: INTERNAL MEDICINE

## 2025-03-25 PROCEDURE — 80053 COMPREHEN METABOLIC PANEL: CPT | Performed by: INTERNAL MEDICINE

## 2025-03-25 PROCEDURE — 99214 OFFICE O/P EST MOD 30 MIN: CPT | Performed by: INTERNAL MEDICINE

## 2025-03-25 RX ORDER — CHOLECALCIFEROL (VITAMIN D3) 25 MCG
1000 TABLET ORAL DAILY
COMMUNITY

## 2025-03-25 NOTE — PROGRESS NOTES
"Hashimoto's Thyroiditis (Hypothyroidism due to Hashimoto's thyroiditis/Experiencing more fatigue, arthritis type symptoms/Receiving generic Synthroid/) and Thyroid Problem (Thyroid nodule)    Subjective   Sharee Cheek is a 54 y.o. female. she is being seen for follow-up  Hypothyroidism dx several years ago.  Levothyroxine resulted in multiple dose changes and symptoms. Patient has been on the same dose for several years, after switching to Synthroid brand she is feeling better. Takes levothyroxine 100 mcg now.      Thyroid nodule noted on us in 2014 - right 1.1 cm nodule. Patient reported sx of tenderness in the lower portion of the thyroid and swallowing difficulties.     A1C was in prediabetes range in the last year. Discussed dietary recommendations.     C/o fatigue and weight gain, arthralgias. She has changed to levothyroxine and not sure if the symptoms are related to switch to generic.     Medications:    Current Outpatient Medications:     Cholecalciferol 25 MCG (1000 UT) tablet, Take 1 tablet by mouth Daily., Disp: , Rfl:     lansoprazole (PREVACID) 30 MG capsule, Take 1 capsule by mouth Daily., Disp: 90 capsule, Rfl: 3    levothyroxine (Synthroid) 100 MCG tablet, Take 1 tablet by mouth Daily., Disp: 90 tablet, Rfl: 3    lisinopril (PRINIVIL,ZESTRIL) 40 MG tablet, TAKE 1 TABLET BY MOUTH EVERY DAY, Disp: 90 tablet, Rfl: 3      Review of Systems   Psychiatric/Behavioral:  Positive for sleep disturbance. The patient is nervous/anxious.    All other systems reviewed and are negative.      Objective   Blood pressure 137/77, pulse 72, height 165.1 cm (65\"), weight 108 kg (239 lb), last menstrual period 11/11/2019, SpO2 97%.   Physical Exam  Vitals reviewed.   Constitutional:       Appearance: Normal appearance.   Neck:      Thyroid: Thyromegaly present.   Cardiovascular:      Rate and Rhythm: Normal rate and regular rhythm.      Pulses: Normal pulses.      Heart sounds: Normal heart sounds.   Pulmonary:    "   Effort: Pulmonary effort is normal.      Breath sounds: Normal breath sounds.   Musculoskeletal:         General: No swelling.   Neurological:      Mental Status: She is alert and oriented to person, place, and time.   Psychiatric:         Mood and Affect: Mood normal.         Thought Content: Thought content normal.          Results for orders placed or performed during the hospital encounter of 07/23/24   ECG 12 Lead    Collection Time: 07/23/24  8:01 AM   Result Value Ref Range    QT Interval 404 ms    QTC Interval 445 ms             Thyroid ultrasound 03/21/24             Real time high resolution imaging of the thyroid gland was performed in transverse and longitudinal planes.      The right lobe measured 4.63 cm L x 1.41 cm AP x 1.49 cm in TV dimension.    The isthmus measured 0.28 cm in thickness.    The left thyroid lobe measured 4.56 cm L x 1.4 cm AP x 1.12 cm in TV dimension.    Thyroid gland is heterogeneous and contains single nodule.    Nodule 1   is located in the right mid lobe and measures 1.12 x 0.89 x 1.2 cm (L X AP X TV).  This nodule is solid, homogeneous, hyperechoic with well-defined margins, and Grade II vascularity on Color Flow Doppler.  It has no artifacts      No pathologic lymph nodes were seen.      Assessment: Right dominant nodule with benign u/s characteristics, the nodule is decreased in size.   Repeat ultrasound in 24 months.       Assessment/Plan:    Problem List Items Addressed This Visit          Endocrine and Metabolic    Hypothyroidism due to Hashimoto's thyroiditis - Primary    Relevant Orders    TSH    T4, Free    Sedimentation Rate    C-reactive Protein    Prediabetes    Relevant Orders    Comprehensive Metabolic Panel    Hemoglobin A1c    Thyroid nodule     Other Visit Diagnoses         Vitamin D deficiency        Relevant Orders    Vitamin D,25-Hydroxy          Right thyroid nodule decreased in size. Cont with every 2 years u/s (2026)   Ordered thyroid labs today and  added A1C, Vit D and inflammatory markers.   Diabetic diet recommendations reviewed. Recommended regular exercises. Consider metformin if levels increasing.   F/u in 12 months

## 2025-05-06 DIAGNOSIS — I10 PRIMARY HYPERTENSION: Chronic | ICD-10-CM

## 2025-05-06 RX ORDER — LISINOPRIL 40 MG/1
40 TABLET ORAL DAILY
Qty: 90 TABLET | Refills: 3 | Status: SHIPPED | OUTPATIENT
Start: 2025-05-06

## 2025-06-10 ENCOUNTER — OFFICE VISIT (OUTPATIENT)
Dept: FAMILY MEDICINE CLINIC | Facility: CLINIC | Age: 55
End: 2025-06-10
Payer: COMMERCIAL

## 2025-06-10 VITALS
SYSTOLIC BLOOD PRESSURE: 130 MMHG | OXYGEN SATURATION: 98 % | HEART RATE: 78 BPM | WEIGHT: 240.2 LBS | DIASTOLIC BLOOD PRESSURE: 86 MMHG | HEIGHT: 65 IN | BODY MASS INDEX: 40.02 KG/M2 | TEMPERATURE: 97.8 F

## 2025-06-10 DIAGNOSIS — K21.9 GASTROESOPHAGEAL REFLUX DISEASE, UNSPECIFIED WHETHER ESOPHAGITIS PRESENT: ICD-10-CM

## 2025-06-10 DIAGNOSIS — R10.2 SUPRAPUBIC PAIN: Primary | ICD-10-CM

## 2025-06-10 PROCEDURE — 99214 OFFICE O/P EST MOD 30 MIN: CPT | Performed by: STUDENT IN AN ORGANIZED HEALTH CARE EDUCATION/TRAINING PROGRAM

## 2025-06-10 RX ORDER — LEVOTHYROXINE SODIUM 100 UG/1
100 TABLET ORAL DAILY
Qty: 90 TABLET | Refills: 2 | Status: SHIPPED | OUTPATIENT
Start: 2025-06-10

## 2025-06-10 RX ORDER — LANSOPRAZOLE 30 MG/1
30 CAPSULE, DELAYED RELEASE ORAL DAILY
Qty: 90 CAPSULE | Refills: 0 | Status: SHIPPED | OUTPATIENT
Start: 2025-06-10

## 2025-06-10 RX ORDER — LEVOTHYROXINE SODIUM 100 MCG
100 TABLET ORAL DAILY
Qty: 90 TABLET | Refills: 2 | Status: SHIPPED | OUTPATIENT
Start: 2025-06-10

## 2025-06-10 NOTE — TELEPHONE ENCOUNTER
Rx Refill Note  Requested Prescriptions     Pending Prescriptions Disp Refills    Synthroid 100 MCG tablet [Pharmacy Med Name: SYNTHROID TABS 100MCG] 90 tablet 3     Sig: TAKE 1 TABLET DAILY      Last office visit with prescribing clinician: 3/25/2025     Next office visit with prescribing clinician: 3/27/2026     Amarilys Page MA  06/10/25, 09:07 EDT

## 2025-06-10 NOTE — ASSESSMENT & PLAN NOTE
- Etiology of patient's suprapubic pain is unclear, has had a very thorough workup with gynecology, including pelvic ultrasounds and CT scan.  The last ultrasound does show a calcific ovarian lesion, patient declined repeat urine culture today because she just had it at her gynecology office 2 weeks ago  - There is discussion to potentially do hysteroscopy with gynecology?  - I am not sure that the patient's pain is gynecologic in nature, could be urologic, concerning for possibly having interstitial cystitis or musculoskeletal issues, unsure about the interstitial cystitis because she is not actually having frequent urination  - She is starting pelvic floor physical therapy tomorrow, great with this  - Will trial amitriptyline 25 mg daily, will have patient follow-up in 4 weeks for reevaluation  - Can consider a urology referral if no improvement in symptoms

## 2025-06-10 NOTE — TELEPHONE ENCOUNTER
Rx Refill Note  Requested Prescriptions     Pending Prescriptions Disp Refills    levothyroxine (SYNTHROID, LEVOTHROID) 100 MCG tablet [Pharmacy Med Name: L-THYROXINE (SYNTHROID) TABS 100MCG] 90 tablet 3     Sig: TAKE 1 TABLET DAILY      Last office visit with prescribing clinician: 3/25/2025     Next office visit with prescribing clinician: 3/27/2026     Amarilys Page MA  06/10/25, 09:08 EDT

## 2025-06-10 NOTE — PROGRESS NOTES
Office Note     Name: Sharee Cheek    : 1970     MRN: [id number]     Chief Complaint  Abdominal Pain (Off and on since July. Came back the last 3 to 4 weeks and in the last few days so is starting to wake up at night with the pain. )    Subjective     History of Present Illness:  Sharee Cheek is a 55 y.o. female who presents today for abdominal pain.  Patient has had abdominal pain on and off since July.  It came back 3 to 4 weeks ago and in the last few days, she has been waking up at night with pain.  Patient had initially gone to an urgent treatment clinic when all this started and UA was negative.  Symptoms resolved.  It recurred again in May and she had an appointment with gynecology on the .  They did a pelvic ultrasound which showed possible calcific pelvic mass versus echogenic bowel.  She ended up having a CT of her abdomen, which was unremarkable.  A repeat ultrasound was done and again, there was again echogenic bowel with an ill-defined calcific ovarian lesion and adnexa.  She denies any pain or burning with urination, just feels like there is constant pressure and fullness in her pelvic area.  Denies any pain or burning with urination.  She will start pelvic floor physical therapy tomorrow.  She has been doing some home exercises.  Reports that bowel movements are normal.  She does not want to repeat a urinalysis.  This was done recently at her gynecologist's office.    She does report that since doing exercises, she has had some pain in the left upper portion of her abdomen.  There is some reflux.  She has not been taking her lansoprazole on a daily basis.          Objective     Past Medical History:   Diagnosis Date    Allergic Penicilline, keflex, seasonal    History of conjunctivitis     History of influenza     History of sinusitis     HL (hearing loss)     Bilateral hearing aids    Hypertension     Hypothyroidism     Hashimotos    Insomnia      Past Surgical History:  "  Procedure Laterality Date    COLONOSCOPY      DIAGNOSTIC LAPAROSCOPY EXPLORATORY LAPAROTOMY      HYSTEROSCOPY ENDOMETRIAL ABLATION      LAPAROSCOPIC CHOLECYSTECTOMY      TONSILLECTOMY AND ADENOIDECTOMY       Family History   Problem Relation Age of Onset    Breast cancer Mother     Breast cancer Maternal Grandmother     Ovarian cancer Maternal Grandmother     Diabetes Maternal Grandmother         Age onset    Polycystic ovary syndrome Maternal Grandmother     Cancer Maternal Grandmother         breast cancer around age 56, returned 7 years later and spread    COPD Other     COPD Father         emphysema; deseased at age 56    Alcohol abuse Maternal Grandfather     COPD Paternal Grandfather         emphysema    Cancer Paternal Grandmother         breast cancer after hit in breast    Alcohol abuse Brother     COPD Brother         emphysema    Depression Brother         bi-polar    Mental illness Brother         bi-polar    Cancer Maternal Uncle         bone cancer    Colon cancer Neg Hx     Colon polyps Neg Hx     Esophageal cancer Neg Hx        Vital Signs  /86   Pulse 78   Temp 97.8 °F (36.6 °C) (Infrared)   Ht 165.1 cm (65\")   Wt 109 kg (240 lb 3.2 oz)   SpO2 98%   BMI 39.97 kg/m²   Estimated body mass index is 39.97 kg/m² as calculated from the following:    Height as of this encounter: 165.1 cm (65\").    Weight as of this encounter: 109 kg (240 lb 3.2 oz).    Physical Exam  Vitals reviewed.   Constitutional:       Appearance: Normal appearance.   Cardiovascular:      Rate and Rhythm: Normal rate.   Pulmonary:      Effort: Pulmonary effort is normal.   Abdominal:      General: Abdomen is flat.      Palpations: Abdomen is soft.      Comments: Tenderness to palpation over suprapubic area and left upper quadrant   Neurological:      Mental Status: She is alert.                   Assessment and Plan     Diagnoses and all orders for this visit:    1. Suprapubic pain (Primary)  Assessment & Plan:  - " Etiology of patient's suprapubic pain is unclear, has had a very thorough workup with gynecology, including pelvic ultrasounds and CT scan.  The last ultrasound does show a calcific ovarian lesion, patient declined repeat urine culture today because she just had it at her gynecology office 2 weeks ago  - There is discussion to potentially do hysteroscopy with gynecology?  - I am not sure that the patient's pain is gynecologic in nature, could be urologic, concerning for possibly having interstitial cystitis or musculoskeletal issues, unsure about the interstitial cystitis because she is not actually having frequent urination  - She is starting pelvic floor physical therapy tomorrow, great with this  - Will trial amitriptyline 25 mg daily, will have patient follow-up in 4 weeks for reevaluation  - Can consider a urology referral if no improvement in symptoms    Orders:  -     amitriptyline (ELAVIL) 25 MG tablet; Take 1 tablet by mouth Every Night.  Dispense: 30 tablet; Refill: 1    2. Gastroesophageal reflux disease, unspecified whether esophagitis present  Assessment & Plan:  - Do suspect the patient's left upper quadrant pain is secondary to uncontrolled acid  - Advised to take the Prevacid on a daily basis for the next 6 to 8 weeks    Orders:  -     lansoprazole (PREVACID) 30 MG capsule; Take 1 capsule by mouth Daily.  Dispense: 90 capsule; Refill: 0    Follow Up  Return in about 4 weeks (around 7/8/2025) for abdominal pain.    Payal Mclaughlin MD

## 2025-06-10 NOTE — ASSESSMENT & PLAN NOTE
- Do suspect the patient's left upper quadrant pain is secondary to uncontrolled acid  - Advised to take the Prevacid on a daily basis for the next 6 to 8 weeks

## 2025-07-02 DIAGNOSIS — R10.2 SUPRAPUBIC PAIN: ICD-10-CM

## 2025-07-29 ENCOUNTER — OFFICE VISIT (OUTPATIENT)
Dept: FAMILY MEDICINE CLINIC | Facility: CLINIC | Age: 55
End: 2025-07-29
Payer: COMMERCIAL

## 2025-07-29 VITALS
HEART RATE: 104 BPM | DIASTOLIC BLOOD PRESSURE: 90 MMHG | WEIGHT: 240.6 LBS | BODY MASS INDEX: 40.08 KG/M2 | SYSTOLIC BLOOD PRESSURE: 150 MMHG | TEMPERATURE: 98.7 F | OXYGEN SATURATION: 96 % | HEIGHT: 65 IN

## 2025-07-29 DIAGNOSIS — I10 PRIMARY HYPERTENSION: Chronic | ICD-10-CM

## 2025-07-29 DIAGNOSIS — E55.9 VITAMIN D DEFICIENCY: ICD-10-CM

## 2025-07-29 DIAGNOSIS — R73.03 PREDIABETES: ICD-10-CM

## 2025-07-29 DIAGNOSIS — N30.10 INTERSTITIAL CYSTITIS: ICD-10-CM

## 2025-07-29 DIAGNOSIS — G47.00 INSOMNIA, UNSPECIFIED TYPE: ICD-10-CM

## 2025-07-29 DIAGNOSIS — K21.9 GERD WITHOUT ESOPHAGITIS: ICD-10-CM

## 2025-07-29 DIAGNOSIS — E06.3 HYPOTHYROIDISM DUE TO HASHIMOTO'S THYROIDITIS: Primary | ICD-10-CM

## 2025-07-29 PROBLEM — R10.2 SUPRAPUBIC PAIN: Status: RESOLVED | Noted: 2025-06-10 | Resolved: 2025-07-29

## 2025-07-29 PROCEDURE — 99214 OFFICE O/P EST MOD 30 MIN: CPT | Performed by: FAMILY MEDICINE

## 2025-07-29 RX ORDER — LISINOPRIL 40 MG/1
40 TABLET ORAL DAILY
Qty: 90 TABLET | Refills: 3 | Status: SHIPPED | OUTPATIENT
Start: 2025-07-29

## 2025-07-29 NOTE — PROGRESS NOTES
Subjective     Chief Complaint  Abdominal Pain (Follow up) and Med Refill    Subjective          Sharee Cheek is a 55 y.o. female who presents today to [facility] for follow up.    HPI:   History of Present Illness    History of Present Illness  The patient is a 55-year-old female who presents for evaluation of abdominal pain, interstitial cystitis, hypertension, and health maintenance.    She was previously prescribed medication for stomach discomfort and bloating, which she has been taking inconsistently. Over the past few weeks, she has experienced stomach pain immediately after eating, prompting her to take the medication more regularly. This has resulted in an improvement in her symptoms, although they have not completely subsided. She plans to continue the medication for another three months before attempting to reduce the dosage as needed.    She began experiencing intense suprapubic pain about a year ago, initially suspecting a urinary tract infection (UTI) despite the absence of other symptoms. Multiple urinalyses and cultures have returned negative results. She has been under the care of a gynecologist, who has not identified any gynecological issues. She was diagnosed with interstitial cystitis by a urologist, who prescribed amitriptyline as needed due to the severity of her symptoms. She does not experience low bladder volumes, urgency, or frequency, but does report pain. She continues to take amitriptyline, which she finds beneficial, although it causes dry mouth. She still experiences some pressure, but the pain has significantly reduced. She is considering tapering off the medication to assess her response.    Her blood pressure readings are typically around 138/86, slightly higher than her desired range. She is making efforts to improve her overall health and requires a refill of her lisinopril prescription.    She consulted her endocrinologist in  03/2025 due to persistent fatigue. Additional blood work was performed, including CBC, CRP, and ESR. In 12/2024, she saw a cardiologist following an abnormal EKG, which suggested a possible heart attack. However, the cardiologist did not express any concerns at that time. Her cholesterol levels were stable. Her CBC and CMP results were normal, but there was a slight increase in inflammation markers, likely due to her cystitis and arthritis. She is scheduled for annual follow-ups with her cardiologist in 12/2025. She has an annual mammogram scheduled for early 08/2025. She takes vitamin D supplements, with her last level check in 03/2025 showing an increase from 16 to 24. She reports no issues with headaches, although she occasionally experiences them during weather changes. She had ear tubes placed in both ears in 07/2024, which have improved her condition by equalizing the pressure.    PAST SURGICAL HISTORY:  Ear tubes placement in both ears in 07/2024      The following portions of the patient's history were reviewed and updated as appropriate: allergies, current medications, past family history, past medical history, past social history, past surgical history and problem list.    Objective     Objective     Allergy:   Allergies   Allergen Reactions    Ibuprofen Anaphylaxis    Acetaminophen Other (See Comments)     Atypical sedation      Keflex [Cephalexin]     Penicillins         Current Medications:   Current Outpatient Medications   Medication Sig Dispense Refill    amitriptyline (ELAVIL) 25 MG tablet Take 1 tablet by mouth Every Night. 90 tablet 3    Cholecalciferol 25 MCG (1000 UT) tablet Take 1 tablet by mouth Daily.      lansoprazole (PREVACID) 30 MG capsule Take 1 capsule by mouth Daily. 90 capsule 0    levothyroxine (SYNTHROID, LEVOTHROID) 100 MCG tablet TAKE 1 TABLET DAILY 90 tablet 2    lisinopril (PRINIVIL,ZESTRIL) 40 MG tablet Take 1 tablet by mouth Daily. 90 tablet 3    Synthroid 100 MCG tablet TAKE  1 TABLET DAILY 90 tablet 2     No current facility-administered medications for this visit.       Past Medical History:  Past Medical History:   Diagnosis Date    Allergic Penicilline, keflex, seasonal    History of conjunctivitis     History of influenza     History of sinusitis     HL (hearing loss)     Bilateral hearing aids    Hypertension     Hypothyroidism     Hashimotos    Insomnia        Past Surgical History:  Past Surgical History:   Procedure Laterality Date    COLONOSCOPY      DIAGNOSTIC LAPAROSCOPY EXPLORATORY LAPAROTOMY      HYSTEROSCOPY ENDOMETRIAL ABLATION      LAPAROSCOPIC CHOLECYSTECTOMY      TONSILLECTOMY AND ADENOIDECTOMY      TYMPANOSTOMY TUBE PLACEMENT Bilateral        Social History:  Social History     Socioeconomic History    Marital status:    Tobacco Use    Smoking status: Never     Passive exposure: Never    Smokeless tobacco: Never   Vaping Use    Vaping status: Never Used   Substance and Sexual Activity    Alcohol use: No    Drug use: No    Sexual activity: Not Currently     Partners: Male       Family History:  Family History   Problem Relation Age of Onset    Breast cancer Mother     Breast cancer Maternal Grandmother     Ovarian cancer Maternal Grandmother     Diabetes Maternal Grandmother         Age onset    Polycystic ovary syndrome Maternal Grandmother     Cancer Maternal Grandmother         breast cancer around age 56, returned 7 years later and spread    COPD Other     COPD Father         emphysema; deseased at age 56    Alcohol abuse Maternal Grandfather     COPD Paternal Grandfather         emphysema    Cancer Paternal Grandmother         breast cancer after hit in breast    Alcohol abuse Brother     COPD Brother         emphysema    Depression Brother         bi-polar    Mental illness Brother         bi-polar    Cancer Maternal Uncle         bone cancer    Colon cancer Neg Hx     Colon polyps Neg Hx     Esophageal cancer Neg Hx        Vital Signs:   /90    "Pulse 104   Temp 98.7 °F (37.1 °C) (Infrared)   Ht 165.1 cm (65\")   Wt 109 kg (240 lb 9.6 oz)   SpO2 96%   BMI 40.04 kg/m²      Physical Exam:  Physical Exam  Vitals reviewed.   Constitutional:       Appearance: She is not ill-appearing.   Eyes:      Pupils: Pupils are equal, round, and reactive to light.   Neck:      Thyroid: No thyromegaly.   Cardiovascular:      Rate and Rhythm: Normal rate.      Pulses: Normal pulses.   Pulmonary:      Effort: Pulmonary effort is normal.      Breath sounds: Normal breath sounds.   Neurological:      General: No focal deficit present.      Mental Status: She is alert. Mental status is at baseline.   Psychiatric:         Mood and Affect: Mood normal.         Behavior: Behavior normal.         Thought Content: Thought content normal.         Judgment: Judgment normal.               PHQ-9 Score  PHQ-9 Total Score:      Lab Review  No visits with results within 3 Month(s) from this visit.   Latest known visit with results is:   Office Visit on 03/25/2025   Component Date Value Ref Range Status    TSH 03/25/2025 3.460  0.270 - 4.200 uIU/mL Final    Free T4 03/25/2025 1.23  0.92 - 1.68 ng/dL Final    Glucose 03/25/2025 107 (H)  65 - 99 mg/dL Final    BUN 03/25/2025 12  6 - 20 mg/dL Final    Creatinine 03/25/2025 0.82  0.57 - 1.00 mg/dL Final    Sodium 03/25/2025 137  136 - 145 mmol/L Final    Potassium 03/25/2025 4.3  3.5 - 5.2 mmol/L Final    Chloride 03/25/2025 100  98 - 107 mmol/L Final    CO2 03/25/2025 28.9  22.0 - 29.0 mmol/L Final    Calcium 03/25/2025 9.7  8.6 - 10.5 mg/dL Final    Total Protein 03/25/2025 7.6  6.0 - 8.5 g/dL Final    Albumin 03/25/2025 4.1  3.5 - 5.2 g/dL Final    ALT (SGPT) 03/25/2025 12  1 - 33 U/L Final    AST (SGOT) 03/25/2025 14  1 - 32 U/L Final    Alkaline Phosphatase 03/25/2025 99  39 - 117 U/L Final    Total Bilirubin 03/25/2025 0.4  0.0 - 1.2 mg/dL Final    Globulin 03/25/2025 3.5  gm/dL Final    A/G Ratio 03/25/2025 1.2  g/dL Final    " BUN/Creatinine Ratio 03/25/2025 14.6  7.0 - 25.0 Final    Anion Gap 03/25/2025 8.1  5.0 - 15.0 mmol/L Final    eGFR 03/25/2025 85.7  >60.0 mL/min/1.73 Final    Hemoglobin A1C 03/25/2025 5.70 (H)  4.80 - 5.60 % Final    Sed Rate 03/25/2025 30  0 - 30 mm/hr Final    C-Reactive Protein 03/25/2025 0.63 (H)  0.00 - 0.50 mg/dL Final    25 Hydroxy, Vitamin D 03/25/2025 24.3 (L)  30.0 - 100.0 ng/ml Final        Radiology Results        Assessment / Plan         Assessment and Plan   Diagnoses and all orders for this visit:    1. Hypothyroidism due to Hashimoto's thyroiditis (Primary)    2. Prediabetes    3. Primary hypertension  -     lisinopril (PRINIVIL,ZESTRIL) 40 MG tablet; Take 1 tablet by mouth Daily.  Dispense: 90 tablet; Refill: 3    4. GERD without esophagitis    5. Insomnia, unspecified type    6. Interstitial cystitis  -     amitriptyline (ELAVIL) 25 MG tablet; Take 1 tablet by mouth Every Night.  Dispense: 90 tablet; Refill: 3    7. Vitamin D deficiency        Assessment & Plan  1. Abdominal pain.  - Reports stomach pain and bloating have improved with consistent use of the PPI, though symptoms have not completely resolved.  - Recommended to continue the PPI for a minimum of 8 weeks, preferably extending to 3 months, to allow for adequate healing.  - After this period, she can attempt to taper off the medication and use it as needed.    2. Interstitial cystitis.  - Experiencing suprapubic pain managed effectively with amitriptyline; pain returns if doses are missed.  - Plan to continue amitriptyline and monitor symptoms.  - Tapering strategy will be considered in the future if necessary.    3. Hypertension.  - Blood pressure readings slightly elevated today but generally around 138/86.  - Target blood pressure set at 140/90.  - Refill for lisinopril provided.    4. Health maintenance.  - Advised to receive the shingles vaccine at the local pharmacy.  - Recommended to receive the pneumonia vaccine, which can be  administered today or at her convenience at the pharmacy.      Discussed possible differential diagnoses, testing, treatment, recommended non-pharmacological interventions, risks, warning signs to monitor for that would indicate need for follow-up in clinic or ER. If no improvement with these regimens or you have new or worsening symptoms follow-up. Patient verbalizes understanding and agreement with plan of care. Denies further needs or concerns.     Patient was given instructions and counseling regarding her condition and for health maintenance advised.    Class 3 Severe Obesity (BMI >=40). Obesity-related health conditions include the following: impaired fasting glucose and GERD. Obesity is unchanged. BMI is is above average; BMI management plan is completed. We discussed Information on healthy weight added to patient's after visit summary.     Health Maintenance  Health Maintenance:   Health Maintenance Due   Topic Date Due    Pneumococcal Vaccine 50+ (1 of 1 - PCV) Never done    ANNUAL PHYSICAL  05/01/2024        Meds ordered during this visit  New Medications Ordered This Visit   Medications    lisinopril (PRINIVIL,ZESTRIL) 40 MG tablet     Sig: Take 1 tablet by mouth Daily.     Dispense:  90 tablet     Refill:  3    amitriptyline (ELAVIL) 25 MG tablet     Sig: Take 1 tablet by mouth Every Night.     Dispense:  90 tablet     Refill:  3       Meds stopped during this visit:  Medications Discontinued During This Encounter   Medication Reason    lisinopril (PRINIVIL,ZESTRIL) 40 MG tablet Reorder    amitriptyline (ELAVIL) 25 MG tablet Reorder        Visit Diagnoses    ICD-10-CM ICD-9-CM   1. Hypothyroidism due to Hashimoto's thyroiditis  E06.3 245.2   2. Prediabetes  R73.03 790.29   3. Primary hypertension  I10 401.9   4. GERD without esophagitis  K21.9 530.81   5. Insomnia, unspecified type  G47.00 780.52   6. Interstitial cystitis  N30.10 595.1   7. Vitamin D deficiency  E55.9 268.9       Patient was given  instructions and counseling regarding her condition or for health maintenance advice. Please see specific information pulled into the AVS if appropriate.     Follow Up   Return in about 6 months (around 1/29/2026) for followup HTN, GERD , Annual.      Patient or patient representative verbalized consent for the use of Ambient Listening during the visit with  Sveta Cheng DO for chart documentation. 7/29/2025  08:28 EDT      This document has been electronically signed by Sveta Cheng DO   July 29, 2025 08:39 EDT    Dictated Utilizing Dragon Dictation: Part of this note may be an electronic transcription/translation of spoken language to printed text using the Dragon Dictation System.    Sveta Cheng D.O.  [facility] [place]